# Patient Record
Sex: FEMALE | Race: WHITE | NOT HISPANIC OR LATINO | Employment: FULL TIME | URBAN - METROPOLITAN AREA
[De-identification: names, ages, dates, MRNs, and addresses within clinical notes are randomized per-mention and may not be internally consistent; named-entity substitution may affect disease eponyms.]

---

## 2024-04-04 ENCOUNTER — PATIENT ROUNDING (BHMG ONLY) (OUTPATIENT)
Dept: URGENT CARE | Facility: CLINIC | Age: 49
End: 2024-04-04
Payer: COMMERCIAL

## 2024-04-04 NOTE — ED NOTES
Thank you for letting us care for you in your recent visit to our [facility] [place]. We would love to hear about your experience with us.    We’re always looking for ways to make our patients’ experiences even better. Do you have any recommendations on ways we may improve?     I appreciate you taking the time to respond. Please be on the lookout for a survey about your recent visit from Minda Rizo via text or email. We would greatly appreciate if you could fill that out and turn it back in. We want your voice to be heard and we value your feedback.   Thank you for choosing [facility] for your healthcare needs.     Von Rivers RN/Practice Manager

## 2024-04-08 ENCOUNTER — HOSPITAL ENCOUNTER (EMERGENCY)
Facility: HOSPITAL | Age: 49
Discharge: HOME | End: 2024-04-08
Attending: STUDENT IN AN ORGANIZED HEALTH CARE EDUCATION/TRAINING PROGRAM | Admitting: STUDENT IN AN ORGANIZED HEALTH CARE EDUCATION/TRAINING PROGRAM
Payer: COMMERCIAL

## 2024-04-08 VITALS
HEART RATE: 73 BPM | RESPIRATION RATE: 16 BRPM | OXYGEN SATURATION: 92 % | TEMPERATURE: 99.1 F | WEIGHT: 257 LBS | BODY MASS INDEX: 45.54 KG/M2 | DIASTOLIC BLOOD PRESSURE: 71 MMHG | SYSTOLIC BLOOD PRESSURE: 118 MMHG | HEIGHT: 63 IN

## 2024-04-08 DIAGNOSIS — G44.009 CLUSTER HEADACHE, NOT INTRACTABLE, UNSPECIFIED CHRONICITY PATTERN: Primary | ICD-10-CM

## 2024-04-08 LAB
ALBUMIN SERPL-MCNC: 4.2 G/DL (ref 3.5–5.2)
ALBUMIN/GLOB SERPL: 1.9 G/DL
ALP SERPL-CCNC: 52 U/L (ref 39–117)
ALT SERPL W P-5'-P-CCNC: 17 U/L (ref 1–33)
ANION GAP SERPL CALCULATED.3IONS-SCNC: 8.3 MMOL/L (ref 5–15)
APTT PPP: 27.8 SECONDS (ref 22.7–35.4)
AST SERPL-CCNC: 11 U/L (ref 1–32)
BASOPHILS # BLD AUTO: 0.03 10*3/MM3 (ref 0–0.2)
BASOPHILS NFR BLD AUTO: 0.7 % (ref 0–1.5)
BILIRUB SERPL-MCNC: 0.3 MG/DL (ref 0–1.2)
BUN SERPL-MCNC: 9 MG/DL (ref 6–20)
BUN/CREAT SERPL: 9.8 (ref 7–25)
CALCIUM SPEC-SCNC: 9.4 MG/DL (ref 8.6–10.5)
CHLORIDE SERPL-SCNC: 107 MMOL/L (ref 98–107)
CO2 SERPL-SCNC: 24.7 MMOL/L (ref 22–29)
CREAT SERPL-MCNC: 0.92 MG/DL (ref 0.57–1)
DEPRECATED RDW RBC AUTO: 45.2 FL (ref 37–54)
EGFRCR SERPLBLD CKD-EPI 2021: 77 ML/MIN/1.73
EOSINOPHIL # BLD AUTO: 0.17 10*3/MM3 (ref 0–0.4)
EOSINOPHIL NFR BLD AUTO: 3.7 % (ref 0.3–6.2)
ERYTHROCYTE [DISTWIDTH] IN BLOOD BY AUTOMATED COUNT: 12.9 % (ref 12.3–15.4)
GLOBULIN UR ELPH-MCNC: 2.2 GM/DL
GLUCOSE SERPL-MCNC: 102 MG/DL (ref 65–99)
HCT VFR BLD AUTO: 38.8 % (ref 34–46.6)
HGB BLD-MCNC: 12.9 G/DL (ref 12–15.9)
IMM GRANULOCYTES # BLD AUTO: 0.02 10*3/MM3 (ref 0–0.05)
IMM GRANULOCYTES NFR BLD AUTO: 0.4 % (ref 0–0.5)
INR PPP: 1.09 (ref 0.9–1.1)
LYMPHOCYTES # BLD AUTO: 1.82 10*3/MM3 (ref 0.7–3.1)
LYMPHOCYTES NFR BLD AUTO: 39.7 % (ref 19.6–45.3)
MCH RBC QN AUTO: 31.5 PG (ref 26.6–33)
MCHC RBC AUTO-ENTMCNC: 33.2 G/DL (ref 31.5–35.7)
MCV RBC AUTO: 94.6 FL (ref 79–97)
MONOCYTES # BLD AUTO: 0.22 10*3/MM3 (ref 0.1–0.9)
MONOCYTES NFR BLD AUTO: 4.8 % (ref 5–12)
NEUTROPHILS NFR BLD AUTO: 2.32 10*3/MM3 (ref 1.7–7)
NEUTROPHILS NFR BLD AUTO: 50.7 % (ref 42.7–76)
NRBC BLD AUTO-RTO: 0 /100 WBC (ref 0–0.2)
PLATELET # BLD AUTO: 173 10*3/MM3 (ref 140–450)
PMV BLD AUTO: 9.8 FL (ref 6–12)
POTASSIUM SERPL-SCNC: 4 MMOL/L (ref 3.5–5.2)
PROT SERPL-MCNC: 6.4 G/DL (ref 6–8.5)
PROTHROMBIN TIME: 14.4 SECONDS (ref 11.7–14.2)
RBC # BLD AUTO: 4.1 10*6/MM3 (ref 3.77–5.28)
SODIUM SERPL-SCNC: 140 MMOL/L (ref 136–145)
WBC NRBC COR # BLD AUTO: 4.58 10*3/MM3 (ref 3.4–10.8)

## 2024-04-08 PROCEDURE — 85025 COMPLETE CBC W/AUTO DIFF WBC: CPT | Performed by: STUDENT IN AN ORGANIZED HEALTH CARE EDUCATION/TRAINING PROGRAM

## 2024-04-08 PROCEDURE — 96374 THER/PROPH/DIAG INJ IV PUSH: CPT

## 2024-04-08 PROCEDURE — 99283 EMERGENCY DEPT VISIT LOW MDM: CPT

## 2024-04-08 PROCEDURE — 85610 PROTHROMBIN TIME: CPT | Performed by: STUDENT IN AN ORGANIZED HEALTH CARE EDUCATION/TRAINING PROGRAM

## 2024-04-08 PROCEDURE — 25010000002 DIPHENHYDRAMINE PER 50 MG: Performed by: STUDENT IN AN ORGANIZED HEALTH CARE EDUCATION/TRAINING PROGRAM

## 2024-04-08 PROCEDURE — 85730 THROMBOPLASTIN TIME PARTIAL: CPT | Performed by: STUDENT IN AN ORGANIZED HEALTH CARE EDUCATION/TRAINING PROGRAM

## 2024-04-08 PROCEDURE — 80053 COMPREHEN METABOLIC PANEL: CPT | Performed by: STUDENT IN AN ORGANIZED HEALTH CARE EDUCATION/TRAINING PROGRAM

## 2024-04-08 PROCEDURE — 25010000002 PROCHLORPERAZINE 10 MG/2ML SOLUTION: Performed by: STUDENT IN AN ORGANIZED HEALTH CARE EDUCATION/TRAINING PROGRAM

## 2024-04-08 PROCEDURE — 96375 TX/PRO/DX INJ NEW DRUG ADDON: CPT

## 2024-04-08 RX ORDER — DIPHENHYDRAMINE HYDROCHLORIDE 50 MG/ML
25 INJECTION INTRAMUSCULAR; INTRAVENOUS ONCE
Status: COMPLETED | OUTPATIENT
Start: 2024-04-08 | End: 2024-04-08

## 2024-04-08 RX ORDER — PROCHLORPERAZINE EDISYLATE 5 MG/ML
10 INJECTION INTRAMUSCULAR; INTRAVENOUS ONCE
Status: COMPLETED | OUTPATIENT
Start: 2024-04-08 | End: 2024-04-08

## 2024-04-08 RX ADMIN — PROCHLORPERAZINE EDISYLATE 10 MG: 5 INJECTION INTRAMUSCULAR; INTRAVENOUS at 11:30

## 2024-04-08 RX ADMIN — DIPHENHYDRAMINE HYDROCHLORIDE 25 MG: 50 INJECTION, SOLUTION INTRAMUSCULAR; INTRAVENOUS at 11:30

## 2024-04-08 NOTE — ED PROVIDER NOTES
EMERGENCY DEPARTMENT ENCOUNTER    Room Number:  16/16  PCP: Romi Graves APRN  History obtained from: Patient      HPI:  Chief Complaint: Headache  A complete HPI/ROS/PMH/PSH/SH/FH are unobtainable due to: N/A  Context: Emelyn Lopez is a 49 y.o. female who presents to the ED c/o left-sided headache.  Feels like something is stabbing behind her left arm, intermittent.  Woke her from sleep several times last night.  Some dull headache on the left related to recent neck pain however this has been improving.  Has never had headache like this before.  No recent illness, fever, chills.            PAST MEDICAL HISTORY  Active Ambulatory Problems     Diagnosis Date Noted    No Active Ambulatory Problems     Resolved Ambulatory Problems     Diagnosis Date Noted    No Resolved Ambulatory Problems     Past Medical History:   Diagnosis Date    Anxiety     Depression     Seasonal allergies     Vertigo          PAST SURGICAL HISTORY  Past Surgical History:   Procedure Laterality Date    EAR TUBES      MASTOIDECTOMY Left          FAMILY HISTORY  Family History   Problem Relation Age of Onset    Supraventricular tachycardia Mother     Multiple myeloma Father          SOCIAL HISTORY  Social History     Socioeconomic History    Marital status: Unknown   Tobacco Use    Smoking status: Every Day     Current packs/day: 0.75     Types: Cigarettes     Passive exposure: Current    Smokeless tobacco: Never   Vaping Use    Vaping status: Never Used   Substance and Sexual Activity    Alcohol use: No    Drug use: Defer    Sexual activity: Defer         ALLERGIES  Alatrofloxacin, Latex, and Sulfa antibiotics        REVIEW OF SYSTEMS    As per HPI      PHYSICAL EXAM  ED Triage Vitals   Temp Heart Rate Resp BP SpO2   04/08/24 1038 04/08/24 1038 04/08/24 1038 04/08/24 1040 04/08/24 1038   99.1 °F (37.3 °C) 115 16 143/93 99 %      Temp src Heart Rate Source Patient Position BP Location FiO2 (%)   04/08/24 1038 04/08/24 1038 04/08/24  1040 04/08/24 1040 --   Tympanic Monitor Sitting Right arm        Physical Exam  Constitutional:       General: She is not in acute distress.  HENT:      Head: Normocephalic and atraumatic.   Cardiovascular:      Rate and Rhythm: Normal rate and regular rhythm.   Pulmonary:      Effort: Pulmonary effort is normal. No respiratory distress.   Abdominal:      General: There is no distension.      Palpations: Abdomen is soft.      Tenderness: There is no abdominal tenderness.   Musculoskeletal:         General: No swelling or deformity.   Skin:     General: Skin is warm and dry.   Neurological:      Mental Status: She is alert. Mental status is at baseline.           Vital signs and nursing notes reviewed.          LAB RESULTS  Recent Results (from the past 24 hour(s))   Comprehensive Metabolic Panel    Collection Time: 04/08/24 11:25 AM    Specimen: Blood   Result Value Ref Range    Glucose 102 (H) 65 - 99 mg/dL    BUN 9 6 - 20 mg/dL    Creatinine 0.92 0.57 - 1.00 mg/dL    Sodium 140 136 - 145 mmol/L    Potassium 4.0 3.5 - 5.2 mmol/L    Chloride 107 98 - 107 mmol/L    CO2 24.7 22.0 - 29.0 mmol/L    Calcium 9.4 8.6 - 10.5 mg/dL    Total Protein 6.4 6.0 - 8.5 g/dL    Albumin 4.2 3.5 - 5.2 g/dL    ALT (SGPT) 17 1 - 33 U/L    AST (SGOT) 11 1 - 32 U/L    Alkaline Phosphatase 52 39 - 117 U/L    Total Bilirubin 0.3 0.0 - 1.2 mg/dL    Globulin 2.2 gm/dL    A/G Ratio 1.9 g/dL    BUN/Creatinine Ratio 9.8 7.0 - 25.0    Anion Gap 8.3 5.0 - 15.0 mmol/L    eGFR 77.0 >60.0 mL/min/1.73   Protime-INR    Collection Time: 04/08/24 11:25 AM    Specimen: Blood   Result Value Ref Range    Protime 14.4 (H) 11.7 - 14.2 Seconds    INR 1.09 0.90 - 1.10   aPTT    Collection Time: 04/08/24 11:25 AM    Specimen: Blood   Result Value Ref Range    PTT 27.8 22.7 - 35.4 seconds   CBC Auto Differential    Collection Time: 04/08/24 11:25 AM    Specimen: Blood   Result Value Ref Range    WBC 4.58 3.40 - 10.80 10*3/mm3    RBC 4.10 3.77 - 5.28 10*6/mm3     Hemoglobin 12.9 12.0 - 15.9 g/dL    Hematocrit 38.8 34.0 - 46.6 %    MCV 94.6 79.0 - 97.0 fL    MCH 31.5 26.6 - 33.0 pg    MCHC 33.2 31.5 - 35.7 g/dL    RDW 12.9 12.3 - 15.4 %    RDW-SD 45.2 37.0 - 54.0 fl    MPV 9.8 6.0 - 12.0 fL    Platelets 173 140 - 450 10*3/mm3    Neutrophil % 50.7 42.7 - 76.0 %    Lymphocyte % 39.7 19.6 - 45.3 %    Monocyte % 4.8 (L) 5.0 - 12.0 %    Eosinophil % 3.7 0.3 - 6.2 %    Basophil % 0.7 0.0 - 1.5 %    Immature Grans % 0.4 0.0 - 0.5 %    Neutrophils, Absolute 2.32 1.70 - 7.00 10*3/mm3    Lymphocytes, Absolute 1.82 0.70 - 3.10 10*3/mm3    Monocytes, Absolute 0.22 0.10 - 0.90 10*3/mm3    Eosinophils, Absolute 0.17 0.00 - 0.40 10*3/mm3    Basophils, Absolute 0.03 0.00 - 0.20 10*3/mm3    Immature Grans, Absolute 0.02 0.00 - 0.05 10*3/mm3    nRBC 0.0 0.0 - 0.2 /100 WBC       Ordered the above labs and reviewed the results.        RADIOLOGY  No Radiology Exams Resulted Within Past 24 Hours    Ordered the above noted radiological studies. Reviewed by me in PACS.              MEDICATIONS GIVEN IN ER  Medications   prochlorperazine (COMPAZINE) injection 10 mg (10 mg Intravenous Given 4/8/24 1130)   diphenhydrAMINE (BENADRYL) injection 25 mg (25 mg Intravenous Given 4/8/24 1130)               MEDICAL DECISION MAKING, PROGRESS, and CONSULTS    MDM: Patient presented emergency department with stabbing intermittent left-sided headache, retro-orbital.  Otherwise well-appearing, vitals otherwise stable.  Improved after high flow oxygen in the ER.  Labs otherwise reassuring.  Completely resolved at reevaluation.  Will discharge with supportive care and neurology follow-up.  Given return precautions and discharged home.    All labs have been independently reviewed by me.  All radiology studies have been reviewed by me and I have also reviewed the radiology report.   EKG's independently viewed and interpreted by me.  Discussion below represents my analysis of pertinent findings related to patient's  condition, differential diagnosis, treatment plan and final disposition.      Additional sources:  - Discussed/ obtained information from independent historians:      - External (non-ED) record review:     - Chronic or social conditions impacting care:     - Shared decision making: Discussed plan for discharge with outpatient follow-up, patient agrees      Orders placed during this visit:  Orders Placed This Encounter   Procedures    Comprehensive Metabolic Panel    Protime-INR    aPTT    CBC Auto Differential    Ambulatory Referral to Neurology    Oxygen Therapy- Non-Rebreather Mask; Titrate 10-15 LPM Per SpO2; 90 - 95%    CBC & Differential         Additional orders considered but not ordered:  Considered head imaging however given the patient is completely asymptomatic after medications here will defer at this time.        Differential diagnosis includes but is not limited to:    Cluster headache, migraine, tension headache, acute angle-closure glaucoma      Independent interpretation of labs, radiology studies, and discussions with consultants:           DIAGNOSIS  Final diagnoses:   Cluster headache, not intractable, unspecified chronicity pattern         DISPOSITION  Discharged home        Latest Documented Vital Signs:  As of 13:28 EDT  BP- 118/71 HR- 73 Temp- 99.1 °F (37.3 °C) (Tympanic) O2 sat- 92%              --    Please note that portions of this were completed with a voice recognition program.       Note Disclaimer: At [facility], we believe that sharing information builds trust and better relationships. You are receiving this note because you are receiving care at [facility] or recently visited. It is possible you will see health information before a provider has talked with you about it. This kind of information can be easy to misunderstand. To help you fully understand what it means for your health, we urge you to discuss this note with your provider.             Tereso Nathan MD  04/08/24  [id number]

## 2024-04-08 NOTE — Clinical Note
[facility] EMERGENCY DEPARTMENT  [address] [facility] [address]  Phone: [phone]    Emelyn Lopez was seen and treated in our emergency department on 4/8/2024.  She may return to work on 04/09/2024.         Thank you for choosing [facility].    Thea Dennis RN

## 2024-05-21 ENCOUNTER — OFFICE VISIT (OUTPATIENT)
Dept: NEUROLOGY | Facility: CLINIC | Age: 49
End: 2024-05-21
Payer: COMMERCIAL

## 2024-05-21 VITALS
SYSTOLIC BLOOD PRESSURE: 124 MMHG | DIASTOLIC BLOOD PRESSURE: 70 MMHG | OXYGEN SATURATION: 97 % | WEIGHT: 254 LBS | BODY MASS INDEX: 45 KG/M2 | HEIGHT: 63 IN | HEART RATE: 84 BPM

## 2024-05-21 DIAGNOSIS — G43.719 INTRACTABLE CHRONIC MIGRAINE WITHOUT AURA AND WITHOUT STATUS MIGRAINOSUS: Primary | ICD-10-CM

## 2024-05-21 DIAGNOSIS — M54.81 OCCIPITAL NEURALGIA OF LEFT SIDE: ICD-10-CM

## 2024-05-21 DIAGNOSIS — G44.86 CERVICOGENIC HEADACHE: ICD-10-CM

## 2024-05-21 PROCEDURE — 99214 OFFICE O/P EST MOD 30 MIN: CPT | Performed by: NURSE PRACTITIONER

## 2024-05-21 PROCEDURE — 1159F MED LIST DOCD IN RCRD: CPT | Performed by: NURSE PRACTITIONER

## 2024-05-21 PROCEDURE — 1160F RVW MEDS BY RX/DR IN RCRD: CPT | Performed by: NURSE PRACTITIONER

## 2024-05-21 RX ORDER — SUMATRIPTAN 100 MG/1
100 TABLET, FILM COATED ORAL ONCE AS NEEDED
Qty: 9 TABLET | Refills: 2 | Status: SHIPPED | OUTPATIENT
Start: 2024-05-21 | End: 2025-05-21

## 2024-05-21 RX ORDER — TOPIRAMATE 25 MG/1
50 TABLET ORAL 2 TIMES DAILY
Qty: 120 TABLET | Refills: 2 | Status: SHIPPED | OUTPATIENT
Start: 2024-05-21

## 2024-05-21 RX ORDER — AMITRIPTYLINE HYDROCHLORIDE 10 MG/1
10 TABLET, FILM COATED ORAL NIGHTLY
COMMUNITY
Start: 2024-04-22

## 2024-05-21 NOTE — PROGRESS NOTES
DOS: 2024  NAME: Emelyn Lopez   : 1975  PCP: Romi Graves APRN  Chief Complaint   Patient presents with    Migraine      Referring MD: Tereso Nathan MD    Neurological Problem and Interval History:  49 y.o. female with a history of anxiety and depression, seasonal allergies, neck pain, dizziness and headache. She presents today for evaluation for headache. She states she has had headaches for several years but over the past year they have worsened. She reports an almost daily headache. The pain is mostly left sided but she does have headaches that occur over the entire headache. She has photophobia, phonophobia and nausea with the ones that occur over the entire head. She does note chronic neck pain and says she had been given a referral to PT but unfortunately was not able to get this set up and needs a new referral. She has tenderness to the left occipital area. She sees a psychiatrist for her anxiety and depression. She takes Celexa 30 mg daily. She was given a prescription for amitriptyline as well but could not tolerate the side effects and has stopped taking it. She has never been on a triptan. She took a friend's Ubrelvy and says this worked well for her. States she drinks plenty of water. Gets only 5-6 hours of sleep per night. Exercises at least 3 days per week. Cares for her grandfather who has dementia and lives with her. She has quite a bit of stress related to this and attributes this to the little sleep she gets per night.     Past Medical/Surgical Hx:  Past Medical History:   Diagnosis Date    Anxiety     Depression     Seasonal allergies     Vertigo      Past Surgical History:   Procedure Laterality Date    EAR TUBES      MASTOIDECTOMY Left        Review of Systems:        Review of Systems   HENT:  Positive for tinnitus.    Eyes:  Positive for photophobia and visual disturbance.   Gastrointestinal:  Positive for nausea. Negative for vomiting.   Musculoskeletal:  Positive  for neck pain and neck stiffness.   Neurological:  Positive for dizziness, light-headedness and headache. Negative for tremors, seizures, syncope, facial asymmetry, speech difficulty, weakness, numbness, memory problem and confusion.      Medications    Current Outpatient Medications:     albuterol sulfate  (90 Base) MCG/ACT inhaler, Inhale 2 puffs Every 4 (Four) Hours As Needed for Wheezing., Disp: 6.7 g, Rfl: 0    cefdinir (OMNICEF) 300 MG capsule, Take 1 capsule by mouth 2 (Two) Times a Day., Disp: 20 capsule, Rfl: 0    cetirizine (zyrTEC) 10 MG tablet, , Disp: , Rfl:     citalopram (CeleXA) 10 MG tablet, Take 3 tablets by mouth Daily., Disp: , Rfl:     citalopram (CeleXA) 20 MG tablet, 1.5 tablets., Disp: , Rfl:     fluticasone (FLONASE) 50 MCG/ACT nasal spray, , Disp: , Rfl:     meclizine (ANTIVERT) 25 MG tablet, Take 1 tablet by mouth 3 (Three) Times a Day As Needed for Dizziness., Disp: , Rfl:     TiZANidine (ZANAFLEX) 4 MG capsule, Take 1 capsule by mouth 3 (Three) Times a Day As Needed for Muscle Spasms., Disp: 30 capsule, Rfl: 0     Allergies:  Allergies   Allergen Reactions    Alatrofloxacin Nausea Only and Dizziness    Latex Swelling and Rash     LATEX OR ADHESIVE REACTION    Sulfa Antibiotics Nausea Only and Headache       Social Hx:  Social History     Socioeconomic History    Marital status: Unknown   Tobacco Use    Smoking status: Every Day     Current packs/day: 0.75     Types: Cigarettes     Passive exposure: Current    Smokeless tobacco: Never   Vaping Use    Vaping status: Never Used   Substance and Sexual Activity    Alcohol use: No    Drug use: Defer    Sexual activity: Defer       Family Hx:  Family History   Problem Relation Age of Onset    Supraventricular tachycardia Mother     Multiple myeloma Father        I have reviewed and confirmed the accuracy of the patient's history: Chief complaint, HPI, ROS, Subjective, and Past Family Social History as entered by the MA/LPN/RN. Elizabeth  ERINN Hanna 05/21/24      Review of Imaging (Interpretation of scans not reports):      Laboratory Results:       Physical Examination:  General Appearance:  Well developed, well nourished, well groomed, alert, and cooperative.  HEENT: Normocephalic.    Neck and Spine: Normal range of motion.  Normal alignment. No mass or tenderness. No bruits.  Cardiac: Regular rate and rhythm. No murmurs.  Peripheral Vasculature: Radial and pedal pulses are equal and symmetric.   Extremities: No edema or deformities. Normal joint ROM.  Skin: No rashes or birth marks.      Neurological examination:   Higher Integrative Function: Oriented to time, place, and person. Normal registration, recall attention span and concentration. Normal language including comprehension, spontaneous speech, repetition, reading, writing, naming and vocabulary. No neglect with normal visual-spatial function and construction. Normal fund of knowledge and higher integrative function.   CN II: Pupils are equal, round and reactive to light. Normal visual acuity and visual fields.   CN III IV VI: Extraocular movements are full without nystagmus.   CN V: Normal facial sensation and strength of muscles of mastication.   CN VII: Facial movements are symmetric. No weakness.   CN VIII: Auditory acuity is normal.  CN IX & X: Symmetric palatal movement.   CN XI: Sternocleidomastoid and trapezius are normal. No weakness.   CN XII: The tongue is midline. No atrophy or fasciculations.  Motor: Normal muscle strength, bulk and tone in upper and lower extremities. No fasciculations, rigidity, spasticity, or abnormal movements.   Reflexes: 2+ in the upper and lower extremities. Plantar responses are flexor.   Sensation: Normal light touch, pinprick, vibration, temperature, and proprioception in the arms and legs.   Station and Gait: Normal gait and station.   Coordination: Finger to nose test shows no dysmetria. Rapid alternating movements are normal. Heel to shin is normal.             Diagnoses / Discussion:      Plan:       Diagnoses and all orders for this visit:    1. Intractable chronic migraine without aura and without status migrainosus (Primary)  -     Ambulatory Referral to Physical Therapy    2. Cervicogenic headache  -     Ambulatory Referral to Physical Therapy    3. Occipital neuralgia of left side  -     Ambulatory Referral to Physical Therapy    Other orders  -     SUMAtriptan (IMITREX) 100 MG tablet; Take 1 tablet by mouth 1 (One) Time As Needed for Migraine. Take one tablet at onset of headache. May repeat dose one time in 2 hours if headache not relieved.  Dispense: 9 tablet; Refill: 2  -     topiramate (TOPAMAX) 25 MG tablet; Take 2 tablets by mouth 2 (Two) Times a Day.  Dispense: 120 tablet; Refill: 2    Her headaches are most consistent with a mix of occipital neuralgia and migraine. I placed a new referral to PT. She is going to stop amitriptyline and will try topiramate 25 mg twice a day. After a few weeks she can increase to 50 mg twice a day if needed. Side effects discussed. Will try sumatriptan 100 mg as needed. Risks, benefits, side effects reviewed. She did like the Ubrelvy but has not tried any triptans so we likely need to have her try at least 1-2 before insurance will cover Ubrelvy. Discussed continue to work on control of her anxiety and depression to reduce stress and improve headaches. Recommended 8 hours of sleep per night and continuing to drink plenty of water. Discussed the importance of continuing regular exercise as well.     She will call with any problems.   Follow up 8-10 weeks.     Coding

## 2024-05-21 NOTE — LETTER
May 21, 2024       No Recipients    Patient: Emelyn Lopez   YOB: 1975   Date of Visit: 2024     Dear Tereso Nathan MD:       Thank you for referring Emelyn Lopez to me for evaluation. Below are the relevant portions of my assessment and plan of care.    If you have questions, please do not hesitate to call me. I look forward to following Emelyn along with you.         Sincerely,        STEFANY Ricketts        CC:   No Recipients    Lui Block APRN  [phone]  Sign when Signing Visit  DOS: 2024  NAME: Emelyn Lopez   : 1975  PCP: Romi Graves APRN  Chief Complaint   Patient presents with   • Migraine      Referring MD: Tereso Nathan MD    Neurological Problem and Interval History:  49 y.o. female with a history of anxiety and depression, seasonal allergies, neck pain, dizziness and headache. She presents today for evaluation for headache. She states she has had headaches for several years but over the past year they have worsened. She reports an almost daily headache. The pain is mostly left sided but she does have headaches that occur over the entire headache. She has photophobia, phonophobia and nausea with the ones that occur over the entire head. She does note chronic neck pain and says she had been given a referral to PT but unfortunately was not able to get this set up and needs a new referral. She has tenderness to the left occipital area. She sees a psychiatrist for her anxiety and depression. She takes Celexa 30 mg daily. She was given a prescription for amitriptyline as well but could not tolerate the side effects and has stopped taking it. She has never been on a triptan. She took a friend's Ubrelvy and says this worked well for her. States she drinks plenty of water. Gets only 5-6 hours of sleep per night. Exercises at least 3 days per week. Cares for her grandfather who has dementia and lives with her. She has quite a bit of stress  related to this and attributes this to the little sleep she gets per night.     Past Medical/Surgical Hx:  Past Medical History:   Diagnosis Date   • Anxiety    • Depression    • Seasonal allergies    • Vertigo      Past Surgical History:   Procedure Laterality Date   • EAR TUBES     • MASTOIDECTOMY Left        Review of Systems:        Review of Systems   HENT:  Positive for tinnitus.    Eyes:  Positive for photophobia and visual disturbance.   Gastrointestinal:  Positive for nausea. Negative for vomiting.   Musculoskeletal:  Positive for neck pain and neck stiffness.   Neurological:  Positive for dizziness, light-headedness and headache. Negative for tremors, seizures, syncope, facial asymmetry, speech difficulty, weakness, numbness, memory problem and confusion.      Medications    Current Outpatient Medications:   •  albuterol sulfate  (90 Base) MCG/ACT inhaler, Inhale 2 puffs Every 4 (Four) Hours As Needed for Wheezing., Disp: 6.7 g, Rfl: 0  •  cefdinir (OMNICEF) 300 MG capsule, Take 1 capsule by mouth 2 (Two) Times a Day., Disp: 20 capsule, Rfl: 0  •  cetirizine (zyrTEC) 10 MG tablet, , Disp: , Rfl:   •  citalopram (CeleXA) 10 MG tablet, Take 3 tablets by mouth Daily., Disp: , Rfl:   •  citalopram (CeleXA) 20 MG tablet, 1.5 tablets., Disp: , Rfl:   •  fluticasone (FLONASE) 50 MCG/ACT nasal spray, , Disp: , Rfl:   •  meclizine (ANTIVERT) 25 MG tablet, Take 1 tablet by mouth 3 (Three) Times a Day As Needed for Dizziness., Disp: , Rfl:   •  TiZANidine (ZANAFLEX) 4 MG capsule, Take 1 capsule by mouth 3 (Three) Times a Day As Needed for Muscle Spasms., Disp: 30 capsule, Rfl: 0     Allergies:  Allergies   Allergen Reactions   • Alatrofloxacin Nausea Only and Dizziness   • Latex Swelling and Rash     LATEX OR ADHESIVE REACTION   • Sulfa Antibiotics Nausea Only and Headache       Social Hx:  Social History     Socioeconomic History   • Marital status: Unknown   Tobacco Use   • Smoking status: Every Day      Current packs/day: 0.75     Types: Cigarettes     Passive exposure: Current   • Smokeless tobacco: Never   Vaping Use   • Vaping status: Never Used   Substance and Sexual Activity   • Alcohol use: No   • Drug use: Defer   • Sexual activity: Defer       Family Hx:  Family History   Problem Relation Age of Onset   • Supraventricular tachycardia Mother    • Multiple myeloma Father        I have reviewed and confirmed the accuracy of the patient's history: Chief complaint, HPI, ROS, Subjective, and Past Family Social History as entered by the MA/LPN/RN. Elizabeth Hanna MA 05/21/24      Review of Imaging (Interpretation of scans not reports):      Laboratory Results:       Physical Examination:  General Appearance:  Well developed, well nourished, well groomed, alert, and cooperative.  HEENT: Normocephalic.    Neck and Spine: Normal range of motion.  Normal alignment. No mass or tenderness. No bruits.  Cardiac: Regular rate and rhythm. No murmurs.  Peripheral Vasculature: Radial and pedal pulses are equal and symmetric.   Extremities: No edema or deformities. Normal joint ROM.  Skin: No rashes or birth marks.      Neurological examination:   Higher Integrative Function: Oriented to time, place, and person. Normal registration, recall attention span and concentration. Normal language including comprehension, spontaneous speech, repetition, reading, writing, naming and vocabulary. No neglect with normal visual-spatial function and construction. Normal fund of knowledge and higher integrative function.   CN II: Pupils are equal, round and reactive to light. Normal visual acuity and visual fields.   CN III IV VI: Extraocular movements are full without nystagmus.   CN V: Normal facial sensation and strength of muscles of mastication.   CN VII: Facial movements are symmetric. No weakness.   CN VIII: Auditory acuity is normal.  CN IX & X: Symmetric palatal movement.   CN XI: Sternocleidomastoid and trapezius are normal. No  weakness.   CN XII: The tongue is midline. No atrophy or fasciculations.  Motor: Normal muscle strength, bulk and tone in upper and lower extremities. No fasciculations, rigidity, spasticity, or abnormal movements.   Reflexes: 2+ in the upper and lower extremities. Plantar responses are flexor.   Sensation: Normal light touch, pinprick, vibration, temperature, and proprioception in the arms and legs.   Station and Gait: Normal gait and station.   Coordination: Finger to nose test shows no dysmetria. Rapid alternating movements are normal. Heel to shin is normal.            Diagnoses / Discussion:      Plan:       Diagnoses and all orders for this visit:    1. Intractable chronic migraine without aura and without status migrainosus (Primary)  -     Ambulatory Referral to Physical Therapy    2. Cervicogenic headache  -     Ambulatory Referral to Physical Therapy    3. Occipital neuralgia of left side  -     Ambulatory Referral to Physical Therapy    Other orders  -     SUMAtriptan (IMITREX) 100 MG tablet; Take 1 tablet by mouth 1 (One) Time As Needed for Migraine. Take one tablet at onset of headache. May repeat dose one time in 2 hours if headache not relieved.  Dispense: 9 tablet; Refill: 2  -     topiramate (TOPAMAX) 25 MG tablet; Take 2 tablets by mouth 2 (Two) Times a Day.  Dispense: 120 tablet; Refill: 2    Her headaches are most consistent with a mix of occipital neuralgia and migraine. I placed a new referral to PT. She is going to stop amitriptyline and will try topiramate 25 mg twice a day. After a few weeks she can increase to 50 mg twice a day if needed. Side effects discussed. Will try sumatriptan 100 mg as needed. Risks, benefits, side effects reviewed. She did like the Ubrelvy but has not tried any triptans so we likely need to have her try at least 1-2 before insurance will cover Ubrelvy. Discussed continue to work on control of her anxiety and depression to reduce stress and improve headaches.  Recommended 8 hours of sleep per night and continuing to drink plenty of water. Discussed the importance of continuing regular exercise as well.     She will call with any problems.   Follow up 8-10 weeks.     Coding

## 2024-05-22 ENCOUNTER — PATIENT ROUNDING (BHMG ONLY) (OUTPATIENT)
Dept: NEUROLOGY | Facility: CLINIC | Age: 49
End: 2024-05-22
Payer: COMMERCIAL

## 2024-05-23 ENCOUNTER — TELEPHONE (OUTPATIENT)
Dept: NEUROLOGY | Facility: CLINIC | Age: 49
End: 2024-05-23
Payer: COMMERCIAL

## 2024-05-23 NOTE — TELEPHONE ENCOUNTER
Provider: ANNALISE    Caller: DORIE     Relationship to Patient: SELF     Phone Number: [phone]    Reason for Call: PATIENT TOPAMAX WAS DENIED BY INSURANCE; PATIENT IS REQUESTING FOR OTHER OPTIONS. PLEASE CONTACT PATIENT ONCE A SOLUTION IS FOUND.    PLEASE REVIEW    THANK YOU

## 2024-06-11 ENCOUNTER — HOSPITAL ENCOUNTER (OUTPATIENT)
Dept: PHYSICAL THERAPY | Facility: HOSPITAL | Age: 49
Discharge: HOME | End: 2024-06-11
Admitting: NURSE PRACTITIONER
Payer: COMMERCIAL

## 2024-06-11 DIAGNOSIS — M54.81 OCCIPITAL NEURALGIA OF LEFT SIDE: ICD-10-CM

## 2024-06-11 DIAGNOSIS — G44.86 CERVICOGENIC HEADACHE: Primary | ICD-10-CM

## 2024-06-11 PROCEDURE — 97161 PT EVAL LOW COMPLEX 20 MIN: CPT

## 2024-06-11 PROCEDURE — 97110 THERAPEUTIC EXERCISES: CPT

## 2024-06-11 NOTE — THERAPY EVALUATION
Outpatient Physical Therapy Ortho Initial Evaluation  [facility]     Patient Name: Emelyn Lopez  : 1975  MRN: [id number]  Today's Date: 2024      Visit Date: 2024    There is no problem list on file for this patient.       Past Medical History:   Diagnosis Date    Anxiety     Cluster headache     Depression     Migraine     Seasonal allergies     Vertigo         Past Surgical History:   Procedure Laterality Date    EAR TUBES      MASTOIDECTOMY Left        Visit Dx:     ICD-10-CM ICD-9-CM   1. Cervicogenic headache  G44.86 784.0   2. Occipital neuralgia of left side  M54.81 723.8          Patient History       Row Name 24 0700             History    Chief Complaint Pain  Headaches  -ER      Type of Pain Neck pain  -ER      Date Current Problem(s) Began --  2023, cleaning garage  -ER      Brief Description of Current Complaint Emelyn Lopez is a 49 year old female referred to [facility] for evaluation of cervicogenic headaches with L sided occipital neuralgia. She reports that she started to notice L sided neck pain when she was cleaning out her garage and said when lifting with her L hand, the right side of her neck started hurting and vise versa. She also has hx of TMJ spasms per report. She has used muscle relaxers for pain management, migraine meds as well. She notes that her headaches have been progressing over the past year, and she has dizziness 24/7. She has difficulty with OH repetitive movements. 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  -ER      Previous treatment for THIS PROBLEM Surgery  Muscle relaxersm, migraine meds, tylenol and advil as needed  -ER      Patient/Caregiver Goals Relieve pain;Return to work;Improve mobility;Improve strength;Know what to do to help the symptoms;Relief from dizziness  -ER      Occupation/sports/leisure activities Lifting, desk work  -ER       Patient seeing anyone else for problem(s)? Neurologist  -ER      How has patient tried to help current problem? Meds as needed, laying down in a cold dark room  -ER      History of Previous Related Injuries L ear: mastoidectomy w/ graft, bilateral ears balloon  -ER         Pain     Pain Location Neck  -ER      Pain at Present 3  -ER      Pain at Best 3  -ER      Pain at Worst 8  -ER      Pain Frequency Intermittent  -ER      Pain Description Tightness;Nagging  -ER      What Performance Factors Make the Current Problem(s) WORSE? Typing at a desk, looking down trying to read, lifting weights, gardening  -ER      What Performance Factors Make the Current Problem(s) BETTER? Resting in a cold dark room, medication as needed, heating pad, massage gun  -ER      Is your sleep disturbed? Yes  -ER      Total hours of sleep per night --  if on muscle relaxer - not waking, otherwise is waking up  -ER      What position do you sleep in? Right sidelying;Left sidelying;Prone  -ER      Difficulties at work? typing on computer  -ER      Difficulties with ADL's? N/A  -ER      Difficulties with recreational activities? pain limiting, dizziness  -ER         Fall Risk Assessment    Any falls in the past year: No  reports near falls several times  -ER         Services    Prior Rehab/Home Health Experiences No  -ER      Are you currently receiving Home Health services No  -ER      Do you plan to receive Home Health services in the near future No  -ER         Daily Activities    Primary Language English  -ER      Are you able to read Yes  -ER      Are you able to write Yes  -ER      How does patient learn best? Listening;Reading;Demonstration  -ER      Does patient have problems with the following? Depression;Anxiety;Panic Attack  -ER      Barriers to learning None  -ER      Pt Participated in POC and Goals Yes  -ER                User Key  (r) = Recorded By, (t) = Taken By, (c) = Cosigned By      Initials Name Provider Type    ER Danial,  PEMA Portillo Physical Therapist                     PT Ortho       Row Name 06/11/24 0700       Posture/Observations    Alignment Options Forward head;Rounded shoulders  -ER    Forward Head Moderate  -ER    Rounded Shoulders Moderate;Severe  -ER    Posture/Observations Comments extremely guarded UT bilaterally with trigger points  -ER       Quarter Clearing    Quarter Clearing Upper Quarter Clearing  -ER       DTR- Upper Quarter Clearing    Biceps (C5/6) Left:;1- Minimal response;Right:;2- Normal response  -ER       Sensory Screen for Light Touch- Upper Quarter Clearing    C4 (posterior shoulder) Bilateral:;Intact  -ER    C5 (lateral upper arm) Bilateral:;Intact  -ER    C6 (tip of thumb) Bilateral:;Diminished  -ER    C7 (tip of 3rd finger) Bilateral:;Diminished  -ER    C8 (tip of 5th finger) Bilateral:;Diminished  -ER    T1 (medial lower arm) Bilateral:;Diminished  -ER       Myotomal Screen- Upper Quarter Clearing    Shoulder flexion (C5) Right:;4+ (Good +);Left:;4 (Good)  -ER    Elbow flexion/wrist extension (C6) Bilateral:;4 (Good)  -ER    Elbow extension/wrist flexion (C7) Bilateral:;4 (Good)  -ER    Finger flexion/ (C8) Bilateral:;4 (Good)  -ER     Bilateral:;4 (Good)  -ER       Cervical/Shoulder ROM Screen    Cervical flexion Impaired  Limited by 25%, pain relieving  -ER    Cervical extension Impaired  limited by 25%, pain relieving  -ER    Cervical lateral flexion Impaired  Limited by 50% to L, limited by 25% to R  -ER    Cervical rotation Impaired  to the R normal, to the L limited by 25%  -ER    Shoulder elevation  Normal  -ER       Special Tests/Palpation    Special Tests/Palpation Cervical/Thoracic  -ER       Cervical Palpation    Cervical Palpation- Location? Occiput;SCM;Upper traps;Masseter  -ER    Occiput Bilateral:;Tender;Guarded/taut  -ER    SCM Bilateral:;Guarded/taut;Warm  -ER    Upper Traps Bilateral:;Tender;Guarded/taut;Elicits spasm  -ER    Masseter Bilateral:;Guarded/taut;Elicits spasm  -ER        Cervical Accessory Motions    Cervical Accessory Motions Tested? Yes  -ER    Sideglide- C3 Hypomobile;Left pain  -ER    Sideglide- C4 Hypomobile;Left pain  -ER    Sideglide- C5 Hypomobile;Left pain  -ER    Sideglide- C6 Hypomobile;Left pain  -ER    Sideglide- C7 Hypomobile;Left pain  -ER    PA glide- C1 Hypomobile  -ER    PA glide- C2 Hypomobile  -ER    PA glide- C3 Hypomobile  -ER    PA glide- C4 Hypomobile  -ER    PA glide- C5 Hypomobile  -ER    PA glide- C6 Hypomobile  -ER    PA glide- C7 Hypomobile  -ER       Thoracic Accessory Motions    Thoracic Accessory Motions Tested? Yes  -ER    PA glide- T1 Hypomobile  -ER    PA glide- T2 Hypomobile  -ER    PA glide- T3 Hypomobile  -ER    PA glide- T4 Hypomobile  -ER    PA glide- T5 Hypomobile  -ER    PA glide- T6 Hypomobile  -ER    PA glide- T7 Hypomobile  -ER    PA glide- T8 Hypomobile  -ER       General ROM    Head/Neck/Trunk Neck Lt Lateral Flexion;Neck Rt Lateral Flexion;Neck Lt Rotation;Neck Rt Rotation  -ER       Head/Neck/Trunk    Neck Lt Lateral Flexion AROM 12  -ER    Neck Rt Lateral Flexion AROM 20  -ER    Neck Lt Rotation AROM 40  -ER    Neck Rt Rotation AROM 45  -ER    Head/Neck/Trunk Comments seated  -ER       MMT (Manual Muscle Testing)    Rt Upper Ext Rt Shoulder Flexion;Rt Shoulder ABduction  -ER    Lt Upper Ext Lt Shoulder Flexion;Lt Shoulder ABduction  -ER       MMT Right Upper Ext    Rt Shoulder Flexion MMT, Gross Movement (4+/5) good plus  -ER    Rt Shoulder ABduction MMT, Gross Movement (4+/5) good plus  -ER       MMT Left Upper Ext    Lt Shoulder Flexion MMT, Gross Movement (4/5) good  -ER    Lt Shoulder ABduction MMT, Gross Movement (4/5) good  -ER       Sensation    Sensation WNL? WNL  -ER    Additional Comments some N/T into bilateral fingers  -ER       Flexibility    Flexibility Tested? Upper Extremity  -ER       Upper Extremity Flexibility    Suboccipitals Left:;Mildly limited  -ER    SCM Bilateral:;Mildly limited  -ER    Upper Trapezius  Bilateral:;Moderately limited  -ER    Pect Major Bilateral:;Moderately limited  -ER              User Key  (r) = Recorded By, (t) = Taken By, (c) = Cosigned By      Initials Name Provider Type    Lindsey Mulligan, PT Physical Therapist                                Therapy Education  Education Details: Educated on PT role and POC; discussed expectations/timeframe for healing. Provided HEP, Access Code: [email or web address]. Discussed ergonomics in workplace.  Given: HEP, Symptoms/condition management  Program: New  How Provided: Verbal, Demonstration, Written  Provided to: Patient  Level of Understanding: Teach back education performed, Verbalized, Demonstrated      PT OP Goals       Row Name 06/11/24 1200          PT Short Term Goals    STG Date to Achieve 07/11/24  -ER     STG 1 Pt will be independent and verbalized good understanding of initial HEP to improve ability to manage pain, as well as improve strength and ROM.  -ER     STG 1 Progress New  -ER     STG 2 Pt to be educated in/verbalize understanding of the importance of posture/ergonomics in association with their condition to facilitate self management of their condition.  -ER     STG 2 Progress New  -ER     STG 3 Pt. will improve bilateral cervical rotation to 45 deg to assist with driving safely  -ER     STG 3 Progress New  -ER     STG 4 --  -ER     STG 4 Progress --  -ER        Long Term Goals    LTG Date to Achieve 08/10/24  -ER     LTG 1 Pt will be independent and verbalized good understanding of advanced HEP to improve ability to manage pain, as well as improve strength and ROM.  -ER     LTG 1 Progress New  -ER     LTG 2 Patient will improve ability to sleep through the night with 0 sleep disturbances related to shoulder pain to improve overall sleep quality and quality of life.  -ER     LTG 2 Progress New  -ER     LTG 3 Pt will improve NDI score to at least 45% perceived disability to show overall improved quality of life.  -ER     LTG 3 Progress New  -ER      LTG 4 Pt will improve bilateral shoulder flexion strength to at least 4+/5.  -ER     LTG 4 Progress New  -ER     LTG 5 --  -ER     LTG 5 Progress --  -ER        Time Calculation    PT Goal Re-Cert Due Date 09/09/24  -ER               User Key  (r) = Recorded By, (t) = Taken By, (c) = Cosigned By      Initials Name Provider Type    ER Lindsey Barrow, PT Physical Therapist                     PT Assessment/Plan       Row Name 06/11/24 1200          PT Assessment    Functional Limitations Limitation in home management;Limitations in community activities;Performance in leisure activities;Performance in self-care ADL;Performance in work activities  -ER     Impairments Sensation;Range of motion;Posture;Pain;Muscle strength;Joint mobility;Joint integrity  -ER     Assessment Comments Emelyn Lopez is a 49 y.o. female referred to physical therapy for L sided occipital neuralgia, cervicogenic headache. 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 Pt presents today with signficant guarding in bilateral UT, pec major, TTP along mastoid, SCM, UT, and generalized hypomobility in T-spine. L>R pain. Reduced bilateral Sb'ing, rotation which also affects her driving - along with present dizziness. Her signs and symptoms are consistent with referring diagnosis. The previous impairments limit her ability to drive, change positions, perform OH repetitive motions and perform work tasks related to sitting at a desk and typing. Patients NDI outcome measure, 30/50 = 60%, where 100% is severe disability. Pt will benefit from skilled PT to address the previous impairments and return to PLOF.  -ER     Please refer to paper survey for additional self-reported information No  -ER     Rehab Potential Good  -ER      Patient/caregiver participated in establishment of treatment plan and goals Yes  -ER     Patient would benefit from skilled therapy intervention Yes  -ER        PT Plan    PT Frequency 2x/week  -ER     Predicted Duration of Therapy Intervention (PT) 8-10 visits  -ER     Planned CPT's? PT EVAL LOW COMPLEXITY: 45127;PT RE-EVAL: 44266;PT THER PROC EA 15 MIN: 54248;PT THER ACT EA 15 MIN: 31015;PT MANUAL THERAPY EA 15 MIN: 34526;PT NEUROMUSC RE-EDUCATION EA 15 MIN: 11203;PT SELF CARE/HOME MGMT/TRAIN EA 15: 59614;PT HOT OR COLD PACK TREAT MCARE  -ER     PT Plan Comments assess how pt has been since evaluation/HEP compliance. UBE warm up and review HEP. Consider DDN, vestibular eval, supine chin tuck, chin tuck with rotation, chin tuck with lift, seated LS stretch, theracane, alternating supine shoulder flex over noodle/towel, cat cow with head follow, SB rollout, suboccipital release, STM to bilat UT, s/l open books, use moist heat in dark room if needed?  -ER               User Key  (r) = Recorded By, (t) = Taken By, (c) = Cosigned By      Initials Name Provider Type    ER Lindsey Barrow, PT Physical Therapist                       OP Exercises       Row Name 06/11/24 1200             Total Minutes    70439 - PT Therapeutic Exercise Minutes 10  -ER         Exercise 1    Exercise Name 1 UBE  -ER      Additional Comments next visit  -ER         Exercise 2    Exercise Name 2 Pec doorway stretch  -ER      Cueing 2 Verbal;Demo  -ER      Reps 2 3  -ER      Time 2 20  -ER      Additional Comments low position  -ER         Exercise 3    Exercise Name 3 Seated UT stretch/ flex/ext AROM  -ER      Cueing 3 Verbal;Demo  -ER      Reps 3 3  -ER      Time 3 20e  -ER         Exercise 4    Exercise Name 4 Scap Retractions  -ER      Cueing 4 Verbal;Demo  -ER      Sets 4 1  -ER      Reps 4 10  -ER      Time 4 10sec  -ER                User Key  (r) = Recorded By, (t) = Taken By, (c) = Cosigned By      Initials Name Provider Type    ER Danial,  Lindsey PT Physical Therapist                                  Outcome Measure Options: Neck Disability Index (NDI)  Neck Disability Index  Section 1 - Pain Intensity: The pain comes and goes and is moderate.  Section 2 - Personal Care: It is painful to look after myself, and I am slow and careful.  Section 3 - Lifting: Pain prevents me from lifting heavy weights, but I can manage light to medium weights if they are conveniently positioned.  Section 4 - Reading: I can read as much as I want with moderate neck pain.  Section 5 - Headaches: I have headaches almost all the time.  Section 6 - Concentration: I have a great deal of difficulty in concentrating when I want to.  Section 7 - Work: I cannot do my usual work.  Section 8 - Driving: I cannot drive my car as long as I want because of moderate neck pain.  Section 9 - Sleeping: My sleep is moderately disturbed (2-3 hours sleepless).  Section 10 - Recreation: I am able to engage in a few of my usual recreational activities because of pain in my neck.  Neck Disability Index Score: 30  Neck Disability Index Comments: 30/50= 60%      Time Calculation:     Start Time: 0730  Stop Time: 0815  Time Calculation (min): 45 min  Total Timed Code Minutes- PT: 10 minute(s)  Timed Charges  69635 - PT Therapeutic Exercise Minutes: 10  Untimed Charges  PT Eval/Re-eval Minutes: 35  Total Minutes  Timed Charges Total Minutes: 10  Untimed Charges Total Minutes: 35   Total Minutes: 45     Therapy Charges for Today       Code Description Service Date Service Provider Modifiers Qty    01069354055 HC PT THER PROC EA 15 MIN 6/11/2024 Lindsey Barrow, PT GP 1    [id number] HC PT EVAL LOW COMPLEXITY 2 6/11/2024 Lindsey Barrow PT GP 1            PT G-Codes  Outcome Measure Options: Neck Disability Index (NDI)  Neck Disability Index Score: 30         Lindsey Barrow PT  6/11/2024

## 2024-06-12 ENCOUNTER — TELEPHONE (OUTPATIENT)
Dept: NEUROLOGY | Facility: CLINIC | Age: 49
End: 2024-06-12
Payer: COMMERCIAL

## 2024-06-12 NOTE — TELEPHONE ENCOUNTER
Had Left , Bessyt (if applicable) & mailed reminder regarding appt reschedule due to provider being out of office.  Had scheduled pt for July 9th at 8:30 AM.

## 2024-06-19 ENCOUNTER — HOSPITAL ENCOUNTER (OUTPATIENT)
Dept: PHYSICAL THERAPY | Facility: HOSPITAL | Age: 49
Setting detail: THERAPIES SERIES
Discharge: HOME | End: 2024-06-19
Payer: COMMERCIAL

## 2024-06-19 DIAGNOSIS — M54.81 OCCIPITAL NEURALGIA OF LEFT SIDE: ICD-10-CM

## 2024-06-19 DIAGNOSIS — G44.86 CERVICOGENIC HEADACHE: Primary | ICD-10-CM

## 2024-06-19 PROCEDURE — 97110 THERAPEUTIC EXERCISES: CPT

## 2024-06-19 PROCEDURE — 97140 MANUAL THERAPY 1/> REGIONS: CPT

## 2024-06-19 NOTE — THERAPY TREATMENT NOTE
Outpatient Physical Therapy Ortho Treatment Note  [facility]     Patient Name: Emelyn Lopez  : 1975  MRN: [id number]  Today's Date: 2024      Visit Date: 2024    Visit Dx:    ICD-10-CM ICD-9-CM   1. Cervicogenic headache  G44.86 784.0   2. Occipital neuralgia of left side  M54.81 723.8       There is no problem list on file for this patient.       Past Medical History:   Diagnosis Date    Anxiety     Cluster headache     Depression     Migraine     Seasonal allergies     Vertigo         Past Surgical History:   Procedure Laterality Date    EAR TUBES      MASTOIDECTOMY Left                         PT Assessment/Plan       Row Name 24 1600          PT Assessment    Assessment Comments Ms. Lopez returns to PT for first f/u since evaluation for cervicogenic headaches and intractable chronic migraines. Reports good compliance to HEP, benefiting majority from stretches. Spent time doing manual therapy techniques to suboccipital region and UT/LS, pt reporting good relief and no increase in symptoms. She did walk in with a headache, but reports that is a constant sensation. Followed with supine chin tucks, attempted supine chin tucks with rotation but pt became very dizzy (chronic vertigo), and LS stretch. Reviewed HEP, modifying scap retraction into a pain-free range to allow for better comfort. Updated HEP, discussed frequency of stretching daily. Pt may benefit from DDN in the future. At this time, she remains a good candidate for skilled PT.  -        PT Plan    PT Plan Comments assess pt tolerance to first full tx. continue manual as needed, discussion of DDN? consider reviewing towel SNAG, chin tuck with lift, supine HA, standing rows, shoulder ext?  -               User Key  (r) = Recorded By, (t) = Taken By, (c) = Cosigned By      Initials Name Provider Type    Sachin Martinez, PT Physical Therapist                       OP Exercises       Row Name 24 1600 24 1288           Subjective    Subjective Comments The one where you squeeze your shoulder blades has been pinchy. The other stretches are great.  -DR --        Total Minutes    75740 - PT Therapeutic Exercise Minutes --  -DR 25  -DR     18916 - PT Manual Therapy Minutes -- 20  -DR        Exercise 1    Exercise Name 1 UBE  -DR --     Time 1 4 min  -DR --     Additional Comments may do less time next visit, inc tightness  -DR --        Exercise 2    Exercise Name 2 Pec doorway stretch  -DR --     Cueing 2 Verbal;Demo  -DR --     Reps 2 3  -DR --     Time 2 20  -DR --     Additional Comments 60 deg  -DR --        Exercise 3    Exercise Name 3 Seated UT stretch/LS stretch  -DR --     Cueing 3 Verbal;Demo  -DR --     Reps 3 2e  -DR --     Time 3 20s  -DR --        Exercise 4    Exercise Name 4 Scap Retractions  -DR --     Cueing 4 Verbal;Demo  -DR --     Sets 4 1  -DR --     Reps 4 10  -DR --     Time 4 10sec  -DR --     Additional Comments pain-free  -DR --        Exercise 5    Exercise Name 5 supine chin tuck  -DR --     Cueing 5 Verbal;Demo  -DR --     Reps 5 15  -DR --     Time 5 5s  -DR --        Exercise 6    Exercise Name 6 supine chin tuck w/ rotation  -DR --     Cueing 6 Verbal;Demo  -DR --     Reps 6 4  -DR --     Time 6 had to discontinue d/t dizziness  -DR --        Exercise 7    Exercise Name 7 c/s SNAG  -DR --     Additional Comments next visit with towel  -DR --               User Key  (r) = Recorded By, (t) = Taken By, (c) = Cosigned By      Initials Name Provider Type    Sachin Martinez, PT Physical Therapist                             Manual Rx (Last 36 Hours)       Manual Treatments       Row Name 06/19/24 1500             Total Minutes    70734 - PT Manual Therapy Minutes 20  -DR         Manual Rx 1    Manual Rx 1 Location STM B UT TPR  -DR      Manual Rx 1 Type suboccipital release  -DR                User Key  (r) = Recorded By, (t) = Taken By, (c) = Cosigned By      Initials Name Provider Type      Sachin Hickey, PT Physical Therapist                     PT OP Goals       Row Name 06/19/24 1600          PT Short Term Goals    STG Date to Achieve 07/11/24  -     STG 1 Pt will be independent and verbalized good understanding of initial HEP to improve ability to manage pain, as well as improve strength and ROM.  -     STG 1 Progress Ongoing  -DR     STG 2 Pt to be educated in/verbalize understanding of the importance of posture/ergonomics in association with their condition to facilitate self management of their condition.  -     STG 2 Progress Ongoing  -     STG 3 Pt. will improve bilateral cervical rotation to 45 deg to assist with driving safely  -     STG 3 Progress Ongoing  -DR        Long Term Goals    LTG Date to Achieve 08/10/24  -     LTG 1 Pt will be independent and verbalized good understanding of advanced HEP to improve ability to manage pain, as well as improve strength and ROM.  -     LTG 1 Progress Ongoing  -     LTG 2 Patient will improve ability to sleep through the night with 0 sleep disturbances related to shoulder pain to improve overall sleep quality and quality of life.  -     LTG 2 Progress Ongoing  -     LTG 3 Pt will improve NDI score to at least 45% perceived disability to show overall improved quality of life.  -     LTG 3 Progress Ongoing  -     LTG 4 Pt will improve bilateral shoulder flexion strength to at least 4+/5.  -     LTG 4 Progress Ongoing  -               User Key  (r) = Recorded By, (t) = Taken By, (c) = Cosigned By      Initials Name Provider Type    Sachin Martinez, PT Physical Therapist                    Therapy Education  Education Details: updated HEP, discussed frequency of stretching in pain-free range  Given: HEP, Symptoms/condition management  Program: Reinforced, Progressed  How Provided: Verbal, Demonstration, Written  Provided to: Patient  Level of Understanding: Teach back education performed, Verbalized, Demonstrated               Time Calculation:   Start Time: 1600  Stop Time: 1645  Time Calculation (min): 45 min  Timed Charges  68543 - PT Therapeutic Exercise Minutes: 25  31814 - PT Manual Therapy Minutes: 20  Total Minutes  Timed Charges Total Minutes: 45   Total Minutes: 45  Therapy Charges for Today       Code Description Service Date Service Provider Modifiers Qty    53640256748 HC PT THER PROC EA 15 MIN 6/19/2024 Sachin Hickey, PT GP 2    03419073612 HC PT MANUAL THERAPY EA 15 MIN 6/19/2024 Sachin Hickey, PT GP 1                      Sachin Hickey, PT  6/19/2024

## 2024-06-25 ENCOUNTER — APPOINTMENT (OUTPATIENT)
Dept: PHYSICAL THERAPY | Facility: HOSPITAL | Age: 49
End: 2024-06-25
Payer: COMMERCIAL

## 2024-06-27 ENCOUNTER — APPOINTMENT (OUTPATIENT)
Dept: PHYSICAL THERAPY | Facility: HOSPITAL | Age: 49
End: 2024-06-27
Payer: COMMERCIAL

## 2024-07-05 ENCOUNTER — APPOINTMENT (OUTPATIENT)
Dept: PHYSICAL THERAPY | Facility: HOSPITAL | Age: 49
End: 2024-07-05
Payer: COMMERCIAL

## 2024-07-09 ENCOUNTER — OFFICE VISIT (OUTPATIENT)
Dept: NEUROLOGY | Facility: CLINIC | Age: 49
End: 2024-07-09
Payer: COMMERCIAL

## 2024-07-09 ENCOUNTER — APPOINTMENT (OUTPATIENT)
Dept: PHYSICAL THERAPY | Facility: HOSPITAL | Age: 49
End: 2024-07-09
Payer: COMMERCIAL

## 2024-07-09 VITALS
BODY MASS INDEX: 42.88 KG/M2 | HEART RATE: 86 BPM | WEIGHT: 242 LBS | DIASTOLIC BLOOD PRESSURE: 72 MMHG | SYSTOLIC BLOOD PRESSURE: 126 MMHG | OXYGEN SATURATION: 98 % | HEIGHT: 63 IN

## 2024-07-09 DIAGNOSIS — M54.2 NECK PAIN: ICD-10-CM

## 2024-07-09 DIAGNOSIS — G43.719 INTRACTABLE CHRONIC MIGRAINE WITHOUT AURA AND WITHOUT STATUS MIGRAINOSUS: Primary | ICD-10-CM

## 2024-07-09 PROCEDURE — 99213 OFFICE O/P EST LOW 20 MIN: CPT | Performed by: NURSE PRACTITIONER

## 2024-07-09 PROCEDURE — 1160F RVW MEDS BY RX/DR IN RCRD: CPT | Performed by: NURSE PRACTITIONER

## 2024-07-09 PROCEDURE — 1159F MED LIST DOCD IN RCRD: CPT | Performed by: NURSE PRACTITIONER

## 2024-07-09 RX ORDER — RIZATRIPTAN BENZOATE 10 MG/1
10 TABLET, ORALLY DISINTEGRATING ORAL ONCE AS NEEDED
Qty: 12 TABLET | Refills: 5 | Status: SHIPPED | OUTPATIENT
Start: 2024-07-09

## 2024-07-09 RX ORDER — TOPIRAMATE 50 MG/1
50 TABLET, FILM COATED ORAL 2 TIMES DAILY
Qty: 60 TABLET | Refills: 5 | Status: SHIPPED | OUTPATIENT
Start: 2024-07-09

## 2024-07-09 NOTE — PROGRESS NOTES
Subjective   Emelyn Lopez is a 49 y.o. female presenting for follow-up for headaches.  She has a history of anxiety and depression, seasonal allergies, neck pain, dizziness, and headaches.  She has had the headaches for several years but they have worsened over the past year.  At her last visit she reported an almost daily headache.  The pain is mostly left-sided but she does have headaches that occur over the entire head.  She has photophobia, phonophobia, and nausea.  She does also note chronic neck pain.  I referred her to physical therapy at her last visit.  Unfortunately she was not able to start this until this past week.  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  He passed away a few days ago and now she has the flexibility to attend these appointments.  I also started her on topiramate 50 mg twice a day.  She has seen a significant reduction in the frequency of her headaches with this.  She denies any side effects.  She sees psychiatry for her anxiety and depression and takes Celexa 30 mg daily.  She has been on amitriptyline but could not tolerate the side effects and therefore had to stop taking it.  I prescribed sumatriptan for her as well at her last visit.  This did improve her headache, however she said every time that she did get it worsened her neck pain and made her feel stiff in the neck and shoulder area.  She has taken a friend's Ubrelvy and did feel this worked very well for her.  She drinks plenty of water.  Previously was only getting 5 to 6 hours of sleep per night due to caring for her grandfather, however that likely will improve now given that she is no longer caring for him.    Migraine     Review of Systems   Neurological:  Positive for headache (doing better). Negative for dizziness, tremors, seizures, syncope, facial asymmetry, speech difficulty, weakness, light-headedness, numbness, memory problem and confusion.     I have reviewed and confirmed the  accuracy of the patient's history: Chief complaint, HPI, ROS, Subjective, and Past Family Social History as entered by the MA/LPN/RN. Elizabeth Hanna MA 07/09/24      Objective     Physical Examination:  General Appearance:  Well developed, well nourished, well groomed, alert, and cooperative.  HEENT: Normocephalic.    Neck and Spine: Normal range of motion.  Normal alignment. No mass or tenderness. No bruits.  Cardiac: Regular rate and rhythm. No murmurs.  Peripheral Vasculature: Radial and pedal pulses are equal and symmetric.   Extremities: No edema or deformities. Normal joint ROM.  Skin: No rashes or birth marks.      Neurological examination:   Higher Integrative Function: Oriented to time, place, and person. Normal registration, recall attention span and concentration. Normal language including comprehension, spontaneous speech, repetition, reading, writing, naming and vocabulary. No neglect with normal visual-spatial function and construction. Normal fund of knowledge and higher integrative function.   CN II: Pupils are equal, round and reactive to light. Normal visual acuity and visual fields.   CN III IV VI: Extraocular movements are full without nystagmus.   CN V: Normal facial sensation and strength of muscles of mastication.   CN VII: Facial movements are symmetric. No weakness.   CN VIII: Auditory acuity is normal.  CN IX & X: Symmetric palatal movement.   CN XI: Sternocleidomastoid and trapezius are normal. No weakness.   CN XII: The tongue is midline. No atrophy or fasciculations.  Motor: Normal muscle strength, bulk and tone in upper and lower extremities. No fasciculations, rigidity, spasticity, or abnormal movements.   Reflexes: 2+ in the upper and lower extremities. Plantar responses are flexor.   Sensation: Normal light touch, pinprick, vibration, temperature, and proprioception in the arms and legs.   Station and Gait: Normal gait and station.   Coordination: Finger to nose test shows no  dysmetria. Rapid alternating movements are normal. Heel to shin is normal.           Assessment & Plan   Diagnoses and all orders for this visit:    1. Intractable chronic migraine without aura and without status migrainosus (Primary)    2. Neck pain    Other orders  -     rizatriptan MLT (MAXALT-MLT) 10 MG disintegrating tablet; Place 1 tablet on the tongue 1 (One) Time As Needed for Migraine. May repeat in 2 hours if needed. Not to exceed 2 tablets in 24  hours.  Dispense: 12 tablet; Refill: 5  -     topiramate (TOPAMAX) 50 MG tablet; Take 1 tablet by mouth 2 (Two) Times a Day.  Dispense: 60 tablet; Refill: 5    Her headaches have improved with topiramate 50 mg twice a day.  She will continue this and now is able to start physical therapy for her neck pain as well.  She is currently going twice a week for the next several weeks.  She had some neck stiffness associated with the sumatriptan and therefore we will try rizatriptan instead.  Side effects were discussed.  If she does not tolerate this well hopefully at that point we can get Ubrelvy approved.  She has tried this in the past and it did work well for her.  She will call with any problems, otherwise is to follow-up in 6 months.

## 2024-07-11 ENCOUNTER — APPOINTMENT (OUTPATIENT)
Dept: PHYSICAL THERAPY | Facility: HOSPITAL | Age: 49
End: 2024-07-11
Payer: COMMERCIAL

## 2024-07-17 ENCOUNTER — HOSPITAL ENCOUNTER (OUTPATIENT)
Dept: PHYSICAL THERAPY | Facility: HOSPITAL | Age: 49
Setting detail: THERAPIES SERIES
Discharge: HOME | End: 2024-07-17
Payer: COMMERCIAL

## 2024-07-17 DIAGNOSIS — G44.86 CERVICOGENIC HEADACHE: Primary | ICD-10-CM

## 2024-07-17 DIAGNOSIS — M54.81 OCCIPITAL NEURALGIA OF LEFT SIDE: ICD-10-CM

## 2024-07-17 PROCEDURE — 97140 MANUAL THERAPY 1/> REGIONS: CPT

## 2024-07-17 PROCEDURE — 97110 THERAPEUTIC EXERCISES: CPT

## 2024-07-17 NOTE — THERAPY PROGRESS REPORT/RE-CERT
Outpatient Physical Therapy Ortho Progress Note  [facility]     Patient Name: Emelyn Lopez  : 1975  MRN: [id number]  Today's Date: 2024      Visit Date: 2024    Visit Dx:    ICD-10-CM ICD-9-CM   1. Cervicogenic headache  G44.86 784.0   2. Occipital neuralgia of left side  M54.81 723.8       There is no problem list on file for this patient.       Past Medical History:   Diagnosis Date    Anxiety     Cluster headache     Depression     Migraine     Seasonal allergies     Vertigo         Past Surgical History:   Procedure Laterality Date    EAR TUBES      MASTOIDECTOMY Left                         PT Assessment/Plan       Row Name 24 1700          PT Assessment    Functional Limitations Limitation in home management;Limitations in community activities;Performance in leisure activities;Performance in self-care ADL;Performance in work activities  -MO     Impairments Sensation;Range of motion;Posture;Pain;Muscle strength;Joint mobility;Joint integrity  -MO     Assessment Comments Emelyn Lopez has been seen for 3 physical therapy sessions including evaluation for cervicogenic headaches.  Treatment has included therapeutic exercise, manual therapy, and patient education with home exercise program . Progress to physical therapy goals is slow secondary to limited visits however pt has had several extenuating personal factors that have impacted ability to come to therapy sessions. Pt has met 0/3 STG and 0/4 LTG at this time. She reports pain is near baseline status, has not increased significantly. Spent additional time on manual this date to provide soft tissue work to BL upper traps and suboccipital tissue for overall pain relief. Additionally of note, she reports chronic vertigo symptoms with significant hypofunction. She demos onset of dizziness throughout session with all head and trunk movement and would benefit from further vestibular workup in future sessions to address as it  significantly impacts her overall functional mobility. She will benefit from continued skilled physical therapy to address remaining impairments and functional limitations.  -MO     Please refer to paper survey for additional self-reported information No  -MO     Rehab Potential Good  -MO     Patient/caregiver participated in establishment of treatment plan and goals Yes  -MO     Patient would benefit from skilled therapy intervention Yes  -MO        PT Plan    PT Frequency 2x/week;1x/week  -MO     Predicted Duration of Therapy Intervention (PT) 8-10 visits  -MO     Planned CPT's? PT RE-EVAL: 97431;PT THER PROC EA 15 MIN: 82701;PT THER ACT EA 15 MIN: 67078;PT MANUAL THERAPY EA 15 MIN: 17695;PT NEUROMUSC RE-EDUCATION EA 15 MIN: 18131;PT GAIT TRAINING EA 15 MIN: 28483;PT SELF CARE/HOME MGMT/TRAIN EA 15: 11719;PT CANALITH REPOSITIONIN  -MO     PT Plan Comments how was last session? continue manual if beneficial. chin tuck + lift, consider adding small pool noodle to supine exercises if tolerable. Doorway pec stretch  -MO               User Key  (r) = Recorded By, (t) = Taken By, (c) = Cosigned By      Initials Name Provider Type    Jacque Valdovinos, PT Physical Therapist                       OP Exercises       Row Name 24 1600             Precautions    Existing Precautions/Restrictions other (see comments)   chronic dizziness. Limit excessive head rotation and fast movements  -MO         Subjective    Subjective Comments Really just haven't been able to do exercises. 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 Feel like back to baseline really  -MO         Total Minutes    88548 - PT Therapeutic Exercise Minutes 15  -MO      19903 - PT Therapeutic Activity Minutes 5  -MO      05014 - PT Manual Therapy Minutes 25  -MO         Exercise 4    Exercise Name 4 Scap Retractions  -MO      Cueing 4 Verbal  -MO      Sets 4 1  -MO      Reps 4 10  -MO      Time 4  10sec  -MO         Exercise 5    Exercise Name 5 supine chin tuck  -MO      Cueing 5 Verbal;Demo  -MO      Sets 5 1  -MO      Reps 5 10  -MO      Time 5 5s  -MO         Exercise 7    Exercise Name 7 supine rotation stretch  -MO      Cueing 7 Verbal  -MO      Sets 7 2B  -MO      Time 7 30s  -MO      Additional Comments SNAG without the towel-- pt using hand to provide overpressure into rotation  -MO         Exercise 8    Exercise Name 8 Supine HA  -MO      Cueing 8 Verbal;Demo  -MO      Sets 8 2  -MO      Reps 8 10  -MO      Time 8 GTB  -MO         Exercise 9    Exercise Name 9 Gaze stabilization: nods and turns  -MO      Cueing 9 Verbal;Demo  -MO      Sets 9 2e  -MO      Time 9 30s  -MO      Additional Comments onset of dizziness. Provided new HEP  -MO                User Key  (r) = Recorded By, (t) = Taken By, (c) = Cosigned By      Initials Name Provider Type    Jacque Valdovinos, PT Physical Therapist                             Manual Rx (Last 36 Hours)       Manual Treatments       Row Name 07/17/24 1600 07/17/24 1500          Total Minutes    88912 - PT Manual Therapy Minutes 25  -MO --        Manual Rx 1    Manual Rx 1 Location -- cspine  -MO     Manual Rx 1 Type -- suboccipital release, gentle distraction, BL UT STM with sustianed holds  -MO               User Key  (r) = Recorded By, (t) = Taken By, (c) = Cosigned By      Initials Name Provider Type    Jacque Valdovinos, PT Physical Therapist                     PT OP Goals       Row Name 07/17/24 1700          PT Short Term Goals    STG Date to Achieve 07/11/24  -MO     STG 1 Pt will be independent and verbalized good understanding of initial HEP to improve ability to manage pain, as well as improve strength and ROM.  -MO     STG 1 Progress Ongoing  -MO     STG 2 Pt to be educated in/verbalize understanding of the importance of posture/ergonomics in association with their condition to facilitate self management of their condition.  -MO     STG 2 Progress  Ongoing  -MO     STG 3 Pt. will improve bilateral cervical rotation to 45 deg to assist with driving safely  -MO     STG 3 Progress Ongoing  -MO        Long Term Goals    LTG Date to Achieve 08/10/24  -MO     LTG 1 Pt will be independent and verbalized good understanding of advanced HEP to improve ability to manage pain, as well as improve strength and ROM.  -MO     LTG 1 Progress Ongoing  -MO     LTG 2 Patient will improve ability to sleep through the night with 0 sleep disturbances related to shoulder pain to improve overall sleep quality and quality of life.  -MO     LTG 2 Progress Ongoing  -MO     LTG 3 Pt will improve NDI score to at least 45% perceived disability to show overall improved quality of life.  -MO     LTG 3 Progress Ongoing  -MO     LTG 4 Pt will improve bilateral shoulder flexion strength to at least 4+/5.  -MO     LTG 4 Progress Ongoing  -MO               User Key  (r) = Recorded By, (t) = Taken By, (c) = Cosigned By      Initials Name Provider Type    Jacque Valdovinos PT Physical Therapist                                   Time Calculation:   Start Time: 1630  Stop Time: 1715  Time Calculation (min): 45 min  Timed Charges  44251 - PT Therapeutic Exercise Minutes: 15  83582 - PT Manual Therapy Minutes: 25  19686 - PT Therapeutic Activity Minutes: 5  Total Minutes  Timed Charges Total Minutes: 45   Total Minutes: 45  Therapy Charges for Today       Code Description Service Date Service Provider Modifiers Qty    28300150800 HC PT THER PROC EA 15 MIN 7/17/2024 Jacque Solorzano PT GP 1    88194194169 HC PT MANUAL THERAPY EA 15 MIN 7/17/2024 Jacque Solorzano PT GP 2                      Jacque Solorzano PT  7/17/2024

## 2024-07-19 ENCOUNTER — HOSPITAL ENCOUNTER (OUTPATIENT)
Dept: PHYSICAL THERAPY | Facility: HOSPITAL | Age: 49
Setting detail: THERAPIES SERIES
Discharge: HOME | End: 2024-07-19
Payer: COMMERCIAL

## 2024-07-19 DIAGNOSIS — M54.81 OCCIPITAL NEURALGIA OF LEFT SIDE: ICD-10-CM

## 2024-07-19 DIAGNOSIS — G44.86 CERVICOGENIC HEADACHE: Primary | ICD-10-CM

## 2024-07-19 PROCEDURE — 97110 THERAPEUTIC EXERCISES: CPT

## 2024-07-19 NOTE — THERAPY TREATMENT NOTE
"  Outpatient Physical Therapy Ortho Treatment Note  [facility]     Patient Name: Emelyn Lopez  : 1975  MRN: [id number]  Today's Date: 2024      Visit Date: 2024    Visit Dx:    ICD-10-CM ICD-9-CM   1. Cervicogenic headache  G44.86 784.0   2. Occipital neuralgia of left side  M54.81 723.8       There is no problem list on file for this patient.       Past Medical History:   Diagnosis Date    Anxiety     Cluster headache     Depression     Migraine     Seasonal allergies     Vertigo         Past Surgical History:   Procedure Laterality Date    EAR TUBES      MASTOIDECTOMY Left                         PT Assessment/Plan       Row Name 24 1600          PT Assessment    Assessment Comments Emelyn Lopez is a 49 year old female returning for physical therapy treatment session for cervicogenic headaches, reporting overall non compliance to HEP due to extenuating circumstances in personal life. She is mostly recently dealing with a death in the family. Today, transitioned to seated HA with GTB this date. Pt. Initially demonstrating wrist compensations causing N/T at base of R c-spine. With cues to keep wrists in neutral, N/T subsides immediately. Also trialed seated chin tucks with good response. She notes no further increases in pain, and improved posture throughout both C/s and T/s. Pt. Remains a good candidate for skilled PT.  -ER        PT Plan    PT Plan Comments May consider setting up vestibular work up/eval, pt. reports she has \"train whistle\" in her ear, consider chin tuck with lift, add small noodle to supine ex, supine theraloop or supine alt shoulder flexion?  -ER               User Key  (r) = Recorded By, (t) = Taken By, (c) = Cosigned By      Initials Name Provider Type    ER Lindsey Barrow, PT Physical Therapist                       OP Exercises       Row Name 24 1600             Precautions    Existing Precautions/Restrictions other (see comments)  -ER         Subjective    " Subjective Comments My neck is a pain in the butt today. I just returned to work at a desk, so I needed to take some tylenol.  -ER         Subjective Pain    Subjective Pain Comment does not rate numerically, however notes that pain is increased secondary to returning to work.  -ER         Total Minutes    44312 - PT Therapeutic Exercise Minutes 40  -ER         Exercise 1    Exercise Name 1 UBE  -ER      Time 1 4 min  -ER         Exercise 2    Exercise Name 2 Pec doorway stretch  -ER      Cueing 2 Verbal;Demo  -ER      Reps 2 3  -ER      Time 2 20  -ER      Additional Comments 60 deg  -ER         Exercise 3    Exercise Name 3 Seated UT stretch/LS stretch  -ER      Cueing 3 Verbal;Demo  -ER      Reps 3 3e  -ER      Time 3 20s  -ER         Exercise 4    Exercise Name 4 Scap Retractions  -ER      Cueing 4 Verbal  -ER      Sets 4 1  -ER      Reps 4 10  -ER      Time 4 10sec  -ER         Exercise 5    Exercise Name 5 Seated Chin tuck  -ER      Cueing 5 Verbal;Demo  -ER      Sets 5 1  -ER      Reps 5 10  -ER      Time 5 5sec  -ER         Exercise 7    Exercise Name 7 supine rotation stretch  -ER      Cueing 7 Verbal  -ER      Sets 7 2B  -ER      Time 7 30s  -ER      Additional Comments SNAG without the towel, overpressure with hand  -ER         Exercise 8    Exercise Name 8 Seated HA  -ER      Cueing 8 Verbal;Demo  -ER      Sets 8 2  -ER      Reps 8 10  -ER      Time 8 GTB  -ER         Exercise 10    Exercise Name 10 LS stretch  -ER      Cueing 10 Verbal;Demo  -ER      Reps 10 3  -ER      Time 10 20sec  -ER      Additional Comments cued to move head slowly through range to avoid dizziness  -ER                User Key  (r) = Recorded By, (t) = Taken By, (c) = Cosigned By      Initials Name Provider Type    ER Lindsey Barrow, PT Physical Therapist                                  PT OP Goals       Row Name 07/19/24 1600          PT Short Term Goals    STG Date to Achieve 07/11/24  -ER     STG 1 Pt will be independent and  verbalized good understanding of initial HEP to improve ability to manage pain, as well as improve strength and ROM.  -ER     STG 1 Progress Ongoing  -ER     STG 2 Pt to be educated in/verbalize understanding of the importance of posture/ergonomics in association with their condition to facilitate self management of their condition.  -ER     STG 2 Progress Ongoing  -ER     STG 3 Pt. will improve bilateral cervical rotation to 45 deg to assist with driving safely  -ER     STG 3 Progress Ongoing  -ER        Long Term Goals    LTG Date to Achieve 08/10/24  -ER     LTG 1 Pt will be independent and verbalized good understanding of advanced HEP to improve ability to manage pain, as well as improve strength and ROM.  -ER     LTG 1 Progress Ongoing  -ER     LTG 2 Patient will improve ability to sleep through the night with 0 sleep disturbances related to shoulder pain to improve overall sleep quality and quality of life.  -ER     LTG 2 Progress Ongoing  -ER     LTG 3 Pt will improve NDI score to at least 45% perceived disability to show overall improved quality of life.  -ER     LTG 3 Progress Ongoing  -ER     LTG 4 Pt will improve bilateral shoulder flexion strength to at least 4+/5.  -ER     LTG 4 Progress Ongoing  -ER               User Key  (r) = Recorded By, (t) = Taken By, (c) = Cosigned By      Initials Name Provider Type    ER Lindsey Barrow PT Physical Therapist                    Therapy Education  Education Details: Reinforced HEP  Given: HEP  Program: Reinforced  How Provided: Verbal, Demonstration  Provided to: Patient  Level of Understanding: Teach back education performed, Verbalized, Demonstrated              Time Calculation:   Start Time: 1610  Stop Time: 1650  Time Calculation (min): 40 min  Total Timed Code Minutes- PT: 40 minute(s)  Timed Charges  67174 - PT Therapeutic Exercise Minutes: 40  Total Minutes  Timed Charges Total Minutes: 40   Total Minutes: 40  Therapy Charges for Today       Code  Description Service Date Service Provider Modifiers Qty    35181219703 HC PT THER PROC EA 15 MIN 7/19/2024 Lindsey Barrow, PT GP 3                      Lindsey Barrow, PT  7/19/2024

## 2024-07-24 ENCOUNTER — HOSPITAL ENCOUNTER (OUTPATIENT)
Dept: PHYSICAL THERAPY | Facility: HOSPITAL | Age: 49
Setting detail: THERAPIES SERIES
Discharge: HOME | End: 2024-07-24
Payer: COMMERCIAL

## 2024-07-24 DIAGNOSIS — M54.81 OCCIPITAL NEURALGIA OF LEFT SIDE: ICD-10-CM

## 2024-07-24 DIAGNOSIS — G44.86 CERVICOGENIC HEADACHE: Primary | ICD-10-CM

## 2024-07-24 PROCEDURE — 97140 MANUAL THERAPY 1/> REGIONS: CPT

## 2024-07-24 PROCEDURE — 97110 THERAPEUTIC EXERCISES: CPT

## 2024-07-24 NOTE — THERAPY TREATMENT NOTE
Outpatient Physical Therapy Ortho Treatment Note  [facility]     Patient Name: Emelyn Lopez  : 1975  MRN: [id number]  Today's Date: 2024      Visit Date: 2024    Visit Dx:    ICD-10-CM ICD-9-CM   1. Cervicogenic headache  G44.86 784.0   2. Occipital neuralgia of left side  M54.81 723.8       There is no problem list on file for this patient.       Past Medical History:   Diagnosis Date    Anxiety     Cluster headache     Depression     Migraine     Seasonal allergies     Vertigo         Past Surgical History:   Procedure Laterality Date    EAR TUBES      MASTOIDECTOMY Left                         PT Assessment/Plan       Row Name 24 1700          PT Assessment    Assessment Comments Emelyn returns to PT with a newly diagnosed ear infection, which has severely impacted dizziness. Avoided repetitive changes in position, with all exercises completed in sitting and standing. Progressed session with adding rows and shoulder extension to further strengthen dorsal scapular region for support of cervical spine. No difficulty or c/o discomfort with additions today. Ended session with manual techniques to address trigger points and suboccipital release. Will continue to address vestibular issues in future visits. Pt remains appropriate for skilled PT at this time.  -        PT Plan    PT Plan Comments continue vestibular exs and manual therapy to cervical region; consider chin tuck with lift, supine theraloop or supine alt shoulder flexion?  -               User Key  (r) = Recorded By, (t) = Taken By, (c) = Cosigned By      Initials Name Provider Type    Sachin Martinez, PT Physical Therapist                       OP Exercises       Row Name 24 1700 24 1600          Precautions    Existing Precautions/Restrictions -- other (see comments)  chronic dizziness. Limit excessive head rotation and fast movements  -        Subjective    Subjective Comments -- Definitely more pain  since starting back at work. But I currently have an ear infection.  -DR        Total Minutes    78643 - PT Therapeutic Exercise Minutes -- 35  -DR     63395 - PT Manual Therapy Minutes --  -DR 14  -DR        Exercise 1    Exercise Name 1 -- UBE  -DR     Time 1 -- 4 min  -DR        Exercise 2    Exercise Name 2 -- Pec doorway stretch  -DR     Cueing 2 -- Verbal;Demo  -DR     Reps 2 -- 3  -DR     Time 2 -- 20  -DR        Exercise 3    Exercise Name 3 -- Seated UT stretch/LS stretch  -DR     Cueing 3 -- Verbal;Demo  -DR     Reps 3 -- 3e  -DR     Time 3 -- 20s  -DR        Exercise 4    Exercise Name 4 -- Scap Retractions  -DR     Cueing 4 -- Verbal  -DR     Sets 4 -- 1  -DR     Reps 4 -- 10  -DR     Time 4 -- 10sec  -DR        Exercise 5    Exercise Name 5 -- Seated Chin tuck  -DR     Cueing 5 -- Verbal;Demo  -DR     Sets 5 -- 1  -DR     Reps 5 -- 15  -DR     Time 5 -- 5s  -DR        Exercise 6    Exercise Name 6 -- rows/shldr ext  -DR     Cueing 6 -- Verbal;Demo  -DR     Sets 6 -- 2e  -DR     Reps 6 -- 10  -DR     Time 6 -- GTB/RTB  -DR        Exercise 8    Exercise Name 8 -- Seated HA  -DR     Cueing 8 -- Verbal;Demo  -DR     Sets 8 -- 2  -DR     Reps 8 -- 10  -DR     Time 8 -- GTB  -DR               User Key  (r) = Recorded By, (t) = Taken By, (c) = Cosigned By      Initials Name Provider Type    Sachin Martinez, PT Physical Therapist                             Manual Rx (Last 36 Hours)       Manual Treatments       Row Name 07/24/24 1700 07/24/24 1600          Total Minutes    82511 - PT Manual Therapy Minutes --  -DR 14  -DR        Manual Rx 1    Manual Rx 1 Location cspine  -DR --     Manual Rx 1 Type suboccipital release, gentle distraction, BL UT STM with sustianed holds  -DR --               User Key  (r) = Recorded By, (t) = Taken By, (c) = Cosigned By      Initials Name Provider Type    Sachin Martinez, PT Physical Therapist                     PT OP Goals       Row Name 07/24/24 1600          PT  Short Term Goals    STG Date to Achieve 07/11/24  -     STG 1 Pt will be independent and verbalized good understanding of initial HEP to improve ability to manage pain, as well as improve strength and ROM.  -     STG 1 Progress Ongoing  -     STG 2 Pt to be educated in/verbalize understanding of the importance of posture/ergonomics in association with their condition to facilitate self management of their condition.  -     STG 2 Progress Ongoing  -     STG 3 Pt. will improve bilateral cervical rotation to 45 deg to assist with driving safely  -     STG 3 Progress Ongoing  -DR        Long Term Goals    LTG Date to Achieve 08/10/24  -     LTG 1 Pt will be independent and verbalized good understanding of advanced HEP to improve ability to manage pain, as well as improve strength and ROM.  -     LTG 1 Progress Ongoing  -     LTG 2 Patient will improve ability to sleep through the night with 0 sleep disturbances related to shoulder pain to improve overall sleep quality and quality of life.  -     LTG 2 Progress Ongoing  -     LTG 3 Pt will improve NDI score to at least 45% perceived disability to show overall improved quality of life.  -     LTG 3 Progress Ongoing  -     LTG 4 Pt will improve bilateral shoulder flexion strength to at least 4+/5.  -     LTG 4 Progress Ongoing  -               User Key  (r) = Recorded By, (t) = Taken By, (c) = Cosigned By      Initials Name Provider Type    Sachin Martinez, PT Physical Therapist                    Therapy Education  Given: HEP  Program: Reinforced  How Provided: Verbal, Demonstration  Provided to: Patient  Level of Understanding: Teach back education performed, Verbalized, Demonstrated              Time Calculation:   Start Time: 1641  Stop Time: 1730  Time Calculation (min): 49 min  Timed Charges  74928 - PT Therapeutic Exercise Minutes: 35  82378 - PT Manual Therapy Minutes: 14  Total Minutes  Timed Charges Total Minutes: 49   Total  Minutes: 49  Therapy Charges for Today       Code Description Service Date Service Provider Modifiers Qty    16333592033  PT THER PROC EA 15 MIN 7/24/2024 Sachin Hickey, PT GP 2    95143399323 HC PT MANUAL THERAPY EA 15 MIN 7/24/2024 Sachin Hickey, PT GP 1                      Sachin Hickey, PT  7/24/2024

## 2024-07-26 ENCOUNTER — HOSPITAL ENCOUNTER (OUTPATIENT)
Dept: PHYSICAL THERAPY | Facility: HOSPITAL | Age: 49
Setting detail: THERAPIES SERIES
Discharge: HOME | End: 2024-07-26
Payer: COMMERCIAL

## 2024-07-26 DIAGNOSIS — M54.81 OCCIPITAL NEURALGIA OF LEFT SIDE: ICD-10-CM

## 2024-07-26 DIAGNOSIS — G44.86 CERVICOGENIC HEADACHE: Primary | ICD-10-CM

## 2024-07-26 PROCEDURE — 97140 MANUAL THERAPY 1/> REGIONS: CPT

## 2024-07-26 PROCEDURE — 97110 THERAPEUTIC EXERCISES: CPT

## 2024-07-26 NOTE — THERAPY TREATMENT NOTE
Outpatient Physical Therapy Ortho Treatment Note  [facility]     Patient Name: Emelyn Lopez  : 1975  MRN: [id number]  Today's Date: 2024      Visit Date: 2024    Visit Dx:    ICD-10-CM ICD-9-CM   1. Cervicogenic headache  G44.86 784.0   2. Occipital neuralgia of left side  M54.81 723.8       There is no problem list on file for this patient.       Past Medical History:   Diagnosis Date    Anxiety     Cluster headache     Depression     Migraine     Seasonal allergies     Vertigo         Past Surgical History:   Procedure Laterality Date    EAR TUBES      MASTOIDECTOMY Left                         PT Assessment/Plan       Row Name 24 1600          PT Assessment    Assessment Comments Ms. Dunaway returns to PT this date denying any change in symptoms since the last session. Feels like starting back to work this week has increased discomfort. Dizziness more controlled this date, returned to supine exercises to limit UT involvement and initiated several exercises over pool noodle to sustained anterior chest stretch and relative soft tissue work to nina-scap musculature. Added BL shoulder flex with stretch, supine theraloop flex, and chin tuck + lift all with good tolerance. Cueing for full cervical relaxation between lifts. Will assess tolerance at next session and progress as appropriate.  -MO        PT Plan    PT Plan Comments how were exercises? pull noodle to all supine-resisted open field goals, wall walks vertical and horizontal  -MO               User Key  (r) = Recorded By, (t) = Taken By, (c) = Cosigned By      Initials Name Provider Type    Jacque Valdovinos, PT Physical Therapist                       OP Exercises       Row Name 24 1600             Precautions    Existing Precautions/Restrictions other (see comments)  chronic dizziness. Limit excessive head rotation and fast movements  -MO         Subjective    Subjective Comments Work I think is just making this worse  -MO          Total Minutes    89812 - PT Therapeutic Exercise Minutes 30  -MO      39863 - PT Manual Therapy Minutes 15  -MO         Exercise 3    Exercise Name 3 seated UT stretch  -MO      Cueing 3 Verbal  -MO      Reps 3 2e  -MO      Time 3 20s  -MO         Exercise 5    Exercise Name 5 supine chin tuck  -MO      Cueing 5 Verbal  -MO      Sets 5 1  -MO      Reps 5 10  -MO      Time 5 5s  -MO         Exercise 7    Exercise Name 7 supine theraloop flex  -MO      Cueing 7 Verbal;Demo  -MO      Sets 7 2  -MO      Reps 7 10  -MO      Time 7 RTB  -MO         Exercise 8    Exercise Name 8 supine HA  -MO      Cueing 8 Verbal  -MO      Sets 8 2  -MO      Reps 8 10  -MO      Time 8 GTB  -MO         Exercise 11    Exercise Name 11 supine shoulder flex w/ hold  -MO      Cueing 11 Verbal;Demo  -MO      Sets 11 BL hands together  -MO      Reps 11 5  -MO      Time 11 10s  -MO      Additional Comments over noodle  -MO         Exercise 12    Exercise Name 12 supine tuck + lift  -MO      Cueing 12 Verbal;Demo  -MO      Reps 12 1x10  -MO      Time 12 cueing for full relaxation betwwen each rep  -MO      Additional Comments over noodle  -MO                User Key  (r) = Recorded By, (t) = Taken By, (c) = Cosigned By      Initials Name Provider Type    Jacque Valdovinos PT Physical Therapist                             Manual Rx (Last 36 Hours)       Manual Treatments       Row Name 07/26/24 1600 07/26/24 1500          Total Minutes    69173 - PT Manual Therapy Minutes 15  -MO --        Manual Rx 1    Manual Rx 1 Location -- cspine  -MO     Manual Rx 1 Type -- suboccipital release, gentle distraction, BL UT STM with sustianed holds  -MO               User Key  (r) = Recorded By, (t) = Taken By, (c) = Cosigned By      Initials Name Provider Type    Jacque Valdovinos PT Physical Therapist                                       Time Calculation:   Start Time: 1600  Stop Time: 1645  Time Calculation (min): 45 min  Timed Charges  84346 - PT  Therapeutic Exercise Minutes: 30  01449 - PT Manual Therapy Minutes: 15  Total Minutes  Timed Charges Total Minutes: 45   Total Minutes: 45  Therapy Charges for Today       Code Description Service Date Service Provider Modifiers Qty    84894234773  PT THER PROC EA 15 MIN 7/26/2024 Jacque Solorzano, PT GP 2    [id number]  PT MANUAL THERAPY EA 15 MIN 7/26/2024 Jacque Solorzano, PT GP 1                      Jacque Solorzano, PEMA  7/26/2024

## 2024-07-31 ENCOUNTER — APPOINTMENT (OUTPATIENT)
Dept: PHYSICAL THERAPY | Facility: HOSPITAL | Age: 49
End: 2024-07-31
Payer: COMMERCIAL

## 2024-08-09 ENCOUNTER — APPOINTMENT (OUTPATIENT)
Dept: PHYSICAL THERAPY | Facility: HOSPITAL | Age: 49
End: 2024-08-09
Payer: COMMERCIAL

## 2024-08-12 ENCOUNTER — HOSPITAL ENCOUNTER (OUTPATIENT)
Dept: PHYSICAL THERAPY | Facility: HOSPITAL | Age: 49
Setting detail: THERAPIES SERIES
Discharge: HOME | End: 2024-08-12
Payer: COMMERCIAL

## 2024-08-12 DIAGNOSIS — G44.86 CERVICOGENIC HEADACHE: Primary | ICD-10-CM

## 2024-08-12 DIAGNOSIS — M54.81 OCCIPITAL NEURALGIA OF LEFT SIDE: ICD-10-CM

## 2024-08-12 PROCEDURE — 97140 MANUAL THERAPY 1/> REGIONS: CPT

## 2024-08-12 PROCEDURE — 97110 THERAPEUTIC EXERCISES: CPT

## 2024-08-12 NOTE — THERAPY TREATMENT NOTE
Outpatient Physical Therapy Ortho Treatment Note  [facility]     Patient Name: Emelyn Lopez  : 1975  MRN: [id number]  Today's Date: 2024      Visit Date: 2024    Visit Dx:    ICD-10-CM ICD-9-CM   1. Cervicogenic headache  G44.86 784.0   2. Occipital neuralgia of left side  M54.81 723.8       There is no problem list on file for this patient.       Past Medical History:   Diagnosis Date    Anxiety     Cluster headache     Depression     Migraine     Seasonal allergies     Vertigo         Past Surgical History:   Procedure Laterality Date    EAR TUBES      MASTOIDECTOMY Left                         PT Assessment/Plan       Row Name 24 1700          PT Assessment    Assessment Comments Ms. Dunaway returns to PT this date denying any lingering soreness/discomfort following last session however she has had additional health concerns causing poor overall compliance to HEP and worsening migraine and dizziness symptoms. She does report having some alleviation over the last 3 days with adjusted medications and rest. Resumed strengthening program with addition of noodle to all supine exercises as well as added supine resisted field goal openers, wall wash + lift, and horizontal resisted wall walks all with good tolerance. Encouraged continued rest tomorrow, will assess tolerance at next session and progress as appropriate.  -MO        PT Plan    PT Plan Comments SCM stretch. palpate above clavical, BL and extend neck. seated SNAG. vertical wall walks  -MO               User Key  (r) = Recorded By, (t) = Taken By, (c) = Cosigned By      Initials Name Provider Type    Jacque Valdovinos, PT Physical Therapist                       OP Exercises       Row Name 24 1600             Precautions    Existing Precautions/Restrictions other (see comments)  chronic dizziness. Limit excessive head rotation and fast movements  -MO         Subjective    Subjective Comments Rough 2 weeks. Have had the worst  dizziness and migranes. Really just laid in bed the last 3 days and finally getting some relief. Wasn't able to do my HEP really because of the dizziness  -MO         Total Minutes    43803 - PT Therapeutic Exercise Minutes 32  -MO      79584 - PT Manual Therapy Minutes 8  -MO         Exercise 7    Exercise Name 7 supine theraloop flex  -MO      Cueing 7 Verbal  -MO      Sets 7 1  -MO      Reps 7 20  -MO      Time 7 RTB  -MO      Additional Comments over noodle  -MO         Exercise 8    Exercise Name 8 supine HA  -MO      Cueing 8 Verbal  -MO      Sets 8 2  -MO      Reps 8 12  -MO      Time 8 GTB  -MO      Additional Comments over noodle  -MO         Exercise 9    Exercise Name 9 supine fieldgoal openers  -MO      Cueing 9 Verbal;Demo  -MO      Sets 9 2  -MO      Reps 9 12  -MO      Time 9 GTB  -MO      Additional Comments over noodle  -MO         Exercise 10    Exercise Name 10 wall slides + lift  -MO      Cueing 10 Verbal;Demo  -MO      Sets 10 1  -MO      Reps 10 10  -MO      Time 10 3s  -MO         Exercise 12    Exercise Name 12 supine tuck + lift  -MO      Cueing 12 Verbal  -MO      Reps 12 2x10  -MO      Time 12 cueing for full relaxation betwwen each rep  -MO      Additional Comments over noodle  -MO         Exercise 13    Exercise Name 13 wall walk  -MO      Cueing 13 Verbal;Demo  -MO      Reps 13 5 laps  -MO      Time 13 GTB  -MO                User Key  (r) = Recorded By, (t) = Taken By, (c) = Cosigned By      Initials Name Provider Type    Jacque Valdovinos, PT Physical Therapist                             Manual Rx (Last 36 Hours)       Manual Treatments       Row Name 08/12/24 1700 08/12/24 1600          Total Minutes    14761 - PT Manual Therapy Minutes -- 8  -MO        Manual Rx 1    Manual Rx 1 Location cspine  -MO --     Manual Rx 1 Type suboccipital release, gentle distraction, BL UT STM with sustianed holds  -MO --               User Key  (r) = Recorded By, (t) = Taken By, (c) = Cosigned By       Initials Name Provider Type    Jacque Valdovinos PT Physical Therapist                                       Time Calculation:   Start Time: 1630  Stop Time: 1710  Time Calculation (min): 40 min  Timed Charges  53233 - PT Therapeutic Exercise Minutes: 32  03978 - PT Manual Therapy Minutes: 8  Total Minutes  Timed Charges Total Minutes: 40   Total Minutes: 40  Therapy Charges for Today       Code Description Service Date Service Provider Modifiers Qty    71193328162 HC PT THER PROC EA 15 MIN 8/12/2024 Jacque Solorzano, PT GP 2    77524169259 HC PT MANUAL THERAPY EA 15 MIN 8/12/2024 Jacque Solorzano PT GP 1                      Jacque Solorzano PT  8/12/2024

## 2024-08-14 ENCOUNTER — HOSPITAL ENCOUNTER (EMERGENCY)
Facility: HOSPITAL | Age: 49
Discharge: HOME | End: 2024-08-14
Attending: EMERGENCY MEDICINE
Payer: COMMERCIAL

## 2024-08-14 ENCOUNTER — APPOINTMENT (OUTPATIENT)
Dept: PHYSICAL THERAPY | Facility: HOSPITAL | Age: 49
End: 2024-08-14
Payer: COMMERCIAL

## 2024-08-14 VITALS
HEIGHT: 63 IN | SYSTOLIC BLOOD PRESSURE: 104 MMHG | BODY MASS INDEX: 41.64 KG/M2 | TEMPERATURE: 98.3 F | DIASTOLIC BLOOD PRESSURE: 54 MMHG | HEART RATE: 56 BPM | OXYGEN SATURATION: 97 % | WEIGHT: 235 LBS | RESPIRATION RATE: 16 BRPM

## 2024-08-14 DIAGNOSIS — R51.9 ACUTE NONINTRACTABLE HEADACHE, UNSPECIFIED HEADACHE TYPE: Primary | ICD-10-CM

## 2024-08-14 PROCEDURE — 25810000003 SODIUM CHLORIDE 0.9 % SOLUTION: Performed by: PHYSICIAN ASSISTANT

## 2024-08-14 PROCEDURE — 25010000002 DIPHENHYDRAMINE PER 50 MG: Performed by: PHYSICIAN ASSISTANT

## 2024-08-14 PROCEDURE — 96374 THER/PROPH/DIAG INJ IV PUSH: CPT

## 2024-08-14 PROCEDURE — 25010000002 METOCLOPRAMIDE PER 10 MG: Performed by: PHYSICIAN ASSISTANT

## 2024-08-14 PROCEDURE — 25010000002 KETOROLAC TROMETHAMINE PER 15 MG: Performed by: PHYSICIAN ASSISTANT

## 2024-08-14 PROCEDURE — 96375 TX/PRO/DX INJ NEW DRUG ADDON: CPT

## 2024-08-14 PROCEDURE — 99283 EMERGENCY DEPT VISIT LOW MDM: CPT

## 2024-08-14 RX ORDER — METOCLOPRAMIDE HYDROCHLORIDE 5 MG/ML
10 INJECTION INTRAMUSCULAR; INTRAVENOUS ONCE
Status: COMPLETED | OUTPATIENT
Start: 2024-08-14 | End: 2024-08-14

## 2024-08-14 RX ORDER — SODIUM CHLORIDE 0.9 % (FLUSH) 0.9 %
10 SYRINGE (ML) INJECTION AS NEEDED
Status: DISCONTINUED | OUTPATIENT
Start: 2024-08-14 | End: 2024-08-14 | Stop reason: HOSPADM

## 2024-08-14 RX ORDER — KETOROLAC TROMETHAMINE 15 MG/ML
15 INJECTION, SOLUTION INTRAMUSCULAR; INTRAVENOUS ONCE
Status: COMPLETED | OUTPATIENT
Start: 2024-08-14 | End: 2024-08-14

## 2024-08-14 RX ORDER — DIPHENHYDRAMINE HYDROCHLORIDE 50 MG/ML
25 INJECTION INTRAMUSCULAR; INTRAVENOUS ONCE
Status: COMPLETED | OUTPATIENT
Start: 2024-08-14 | End: 2024-08-14

## 2024-08-14 RX ADMIN — METOCLOPRAMIDE 10 MG: 5 INJECTION, SOLUTION INTRAMUSCULAR; INTRAVENOUS at 17:18

## 2024-08-14 RX ADMIN — DIPHENHYDRAMINE HYDROCHLORIDE 25 MG: 50 INJECTION, SOLUTION INTRAMUSCULAR; INTRAVENOUS at 17:16

## 2024-08-14 RX ADMIN — KETOROLAC TROMETHAMINE 15 MG: 15 INJECTION, SOLUTION INTRAMUSCULAR; INTRAVENOUS at 17:17

## 2024-08-14 RX ADMIN — SODIUM CHLORIDE 1000 ML: 9 INJECTION, SOLUTION INTRAVENOUS at 17:13

## 2024-08-14 NOTE — Clinical Note
[facility] EMERGENCY DEPARTMENT  [address] [place] [address]  Phone: [phone]    Emelyn Lopez was seen and treated in our emergency department on 8/14/2024.  She may return to work on 08/15/2024.         Thank you for choosing [facility].    Ami Rockwell RN

## 2024-08-14 NOTE — ED PROVIDER NOTES
EMERGENCY DEPARTMENT ENCOUNTER    Room Number:  S02/02  Date of encounter:  8/14/2024  PCP: Romi Graves APRN  Historian: Patient, family  Chronic or social conditions impacting care (social determinants of health): None    HPI:  Chief Complaint: Headache  A complete HPI/ROS/PMH/PSH/SH/FH are unobtainable due to: Nothing    Context: Emelyn Lopez is a 49 y.o. female with a history of migraine headaches, depression, who presents to the ED c/o acute headache starting over the past 2 days.  Patient reports a history of almost daily headaches.  She has had similar headaches in the past and follows with [facility] neurology.  She typically takes Maxalt and Topamax at home.  Her headache today did not improve with Topamax.  She reports her headache is a global headache which is similar to her previous headaches.  She has had nausea and photophobia.  She denies any fevers or neck pain.  She was initially seen at [facility] and sent here for further evaluation.    Review of prior external notes (non-ED):   I reviewed neurology office visit from 7/9/2024.  Patient being followed for recurrent headaches.    Review of prior external test results outside of this encounter:  I reviewed a CMP from 4/8/2024.  Creatinine 0.92, potassium 4.0    PAST MEDICAL HISTORY  Active Ambulatory Problems     Diagnosis Date Noted    No Active Ambulatory Problems     Resolved Ambulatory Problems     Diagnosis Date Noted    No Resolved Ambulatory Problems     Past Medical History:   Diagnosis Date    Anxiety     Cluster headache     Depression     Migraine     Seasonal allergies     Vertigo          PAST SURGICAL HISTORY  Past Surgical History:   Procedure Laterality Date    EAR TUBES      MASTOIDECTOMY Left          FAMILY HISTORY  Family History   Problem Relation Age of Onset    Supraventricular tachycardia Mother     Multiple myeloma Father          SOCIAL HISTORY  Social History     Socioeconomic History    Marital status:  Unknown   Tobacco Use    Smoking status: Every Day     Current packs/day: 0.75     Types: Cigarettes     Passive exposure: Current    Smokeless tobacco: Never   Vaping Use    Vaping status: Never Used   Substance and Sexual Activity    Alcohol use: No    Drug use: Defer    Sexual activity: Defer         ALLERGIES  Alatrofloxacin, Latex, and Sulfa antibiotics        REVIEW OF SYSTEMS  All systems reviewed and negative except for those discussed in HPI.       PHYSICAL EXAM    I have reviewed the triage vital signs and nursing notes.    ED Triage Vitals   Temp Heart Rate Resp BP SpO2   08/14/24 1649 08/14/24 1649 08/14/24 1649 08/14/24 1707 08/14/24 1649   98.3 °F (36.8 °C) (!) 124 16 131/72 97 %      Temp src Heart Rate Source Patient Position BP Location FiO2 (%)   08/14/24 1649 08/14/24 1649 08/14/24 1707 08/14/24 1707 --   Tympanic Monitor Lying Left arm        Physical Exam  GENERAL: Alert, oriented, not distressed  HENT: head atraumatic, no nuchal rigidity  EYES: no scleral icterus, EOMI  CV: regular rhythm, regular rate, no murmur  RESPIRATORY: normal effort, CTA  ABDOMEN: soft, nontender  MUSCULOSKELETAL: no deformity, FROM, no calf swelling or tenderness  NEURO: alert, normal cerebellar function, normal speech  SKIN: warm, dry        LAB RESULTS  No results found for this or any previous visit (from the past 24 hour(s)).    Ordered the above labs and independently reviewed the results.        RADIOLOGY  No Radiology Exams Resulted Within Past 24 Hours    I ordered the above noted radiological studies. Reviewed by me and discussed with radiologist.  See dictation for official radiology interpretation.      MEDICATIONS GIVEN IN ER    Medications   sodium chloride 0.9 % flush 10 mL (has no administration in time range)   sodium chloride 0.9 % bolus 1,000 mL (0 mL Intravenous Stopped 8/14/24 1809)   metoclopramide (REGLAN) injection 10 mg (10 mg Intravenous Given 8/14/24 1718)   diphenhydrAMINE (BENADRYL)  injection 25 mg (25 mg Intravenous Given 8/14/24 1716)   ketorolac (TORADOL) injection 15 mg (15 mg Intravenous Given 8/14/24 1717)         ADDITIONAL ORDERS CONSIDERED BUT NOT ORDERED:  Admission was considered but after careful review of the patient's presentation, physical examination, diagnostic results, and response to treatment the patient may be safely discharged with outpatient follow-up.       PROGRESS, DATA ANALYSIS, CONSULTS, AND MEDICAL DECISION MAKING    All labs have been independently interpreted by myself.  All radiology studies have been independently interpreted by myself and discussed with radiologist dictating the report.   EKG's independently interpreted by myself.  Discussion below represents my analysis of pertinent findings related to patient's condition, differential diagnosis, treatment plan and final disposition.    I have discussed case with Dr. Pope, emergency room physician.  He has performed his own bedside examination and agrees with treatment plan.    ED Course as of 08/14/24 1856   Wed Aug 14, 2024   1711 Patient complains of acute on chronic diffuse global headache.  Patient reports having similar headaches in the past with the same symptoms.  She denies any new symptoms.  Fairly benign exam without neurodeficits.  Will treat with migraine cocktail and reevaluate. [EE]   1844 Recheck patient.  She states she feels much better.  No neurodeficits.  She does have appropriate neuro follow-up.  We will discharge. [EE]      ED Course User Index  [EE] Thiago Garcia PA       AS OF 18:56 EDT VITALS:    BP - 104/54  HR - 56  TEMP - 98.3 °F (36.8 °C) (Tympanic)  O2 SATS - 97%        DIAGNOSIS  Final diagnoses:   Acute nonintractable headache, unspecified headache type         DISPOSITION  Discharged    Admission was considered but after careful review of the patient's presentation, physical examination, diagnostic results, and response to treatment the patient may be safely discharged  with outpatient follow-up.         Dictated utilizing Lissy dictation     Thiago Garcia PA  08/14/24 6638

## 2024-08-14 NOTE — ED PROVIDER NOTES
EMERGENCY DEPARTMENT MD ATTESTATION NOTE    Room Number:  S02/02  PCP: Romi Graves APRN  Independent Historians: Patient and Family    HPI:  A complete HPI/ROS/PMH/PSH/SH/FH are unobtainable due to: None    Chronic or social conditions impacting patient care (Social Determinants of Health): None      Context: Emelyn Lopez is a 49 y.o. female with a medical history of depression, anxiety, migraine headaches, vertigo who presents to the ED c/o acute migraine headache.  The patient reports she has had a migraine headache for the last 2 days.  She states it is typical of her prior migraines.  She denies any fevers or chills.  She denies any trauma.  She states she has taken her home medications without improvement.  She states she feels she needs to follow-up with her neurologist to adjust her medications because they do not seem to be helping as well as previous.        Review of prior external notes (non-ED) -and- Review of prior external test results outside of this encounter:  Laboratory evaluation 4/8/2024 shows normal CMP, normal CBC, normal INR    Prescription drug monitoring program review:           PHYSICAL EXAM    I have reviewed the triage vital signs and nursing notes.    ED Triage Vitals   Temp Heart Rate Resp BP SpO2   08/14/24 1649 08/14/24 1649 08/14/24 1649 08/14/24 1707 08/14/24 1649   98.3 °F (36.8 °C) (!) 124 16 131/72 97 %      Temp src Heart Rate Source Patient Position BP Location FiO2 (%)   08/14/24 1649 08/14/24 1649 08/14/24 1707 08/14/24 1707 --   Tympanic Monitor Lying Left arm        Physical Exam  GENERAL: Awake, alert, no acute distress  SKIN: Warm, dry  HENT: Normocephalic, atraumatic  EYES: no scleral icterus  CV: regular rhythm, regular rate  RESPIRATORY: normal effort, lungs clear  ABDOMEN: soft, nontender, nondistended  MUSCULOSKELETAL: no deformity  NEURO: alert, moves all extremities, follows commands            MEDICATIONS GIVEN IN ER  Medications   sodium chloride  0.9 % flush 10 mL (has no administration in time range)   sodium chloride 0.9 % bolus 1,000 mL (1,000 mL Intravenous New Bag 8/14/24 1713)   metoclopramide (REGLAN) injection 10 mg (10 mg Intravenous Given 8/14/24 1718)   diphenhydrAMINE (BENADRYL) injection 25 mg (25 mg Intravenous Given 8/14/24 1716)   ketorolac (TORADOL) injection 15 mg (15 mg Intravenous Given 8/14/24 1717)         ORDERS PLACED DURING THIS VISIT:  Orders Placed This Encounter   Procedures    Insert Peripheral IV         PROCEDURES  Procedures            PROGRESS, DATA ANALYSIS, CONSULTS, AND MEDICAL DECISION MAKING  All labs have been independently interpreted by me.  All radiology studies have been reviewed by me. All EKG's have been independently viewed and interpreted by me.  Discussion below represents my analysis of pertinent findings related to patient's condition, differential diagnosis, treatment plan and final disposition.    Differential diagnosis includes but is not limited to migraine headache, intracranial hemorrhage, intracranial mass, intracranial infection, chronic migraine.    Clinical Scores:                   ED Course as of 08/14/24 1846   Wed Aug 14, 2024   1711 Patient complains of acute on chronic diffuse global headache.  Patient reports having similar headaches in the past with the same symptoms.  She denies any new symptoms.  Fairly benign exam without neurodeficits.  Will treat with migraine cocktail and reevaluate. [EE]   1844 Recheck patient.  She states she feels much better.  No neurodeficits.  She does have appropriate neuro follow-up.  We will discharge. [EE]      ED Course User Index  [EE] Thiago Garcia PA       MDM: The patient's headache is typical of her prior migraines.  Plan to give her a migraine cocktail and reevaluate.  If her pain improves then plan discharge home.  She has no symptoms that are concerning for a change in character of her prior headache.      COMPLEXITY OF CARE  Admission was  considered but after careful review of the patient's presentation, physical examination, diagnostic results, and response to treatment the patient may be safely discharged with outpatient follow-up.    Please note that portions of this document were completed with a voice recognition program.    Note Disclaimer: At [facility], we believe that sharing information builds trust and better relationships. You are receiving this note because you recently visited [facility]. It is possible you will see health information before a provider has talked with you about it. This kind of information can be easy to misunderstand. To help you fully understand what it means for your health, we urge you to discuss this note with your provider.         Daniel Pope MD  08/14/24 7079

## 2024-08-15 ENCOUNTER — TELEPHONE (OUTPATIENT)
Dept: NEUROLOGY | Facility: CLINIC | Age: 49
End: 2024-08-15

## 2024-08-15 NOTE — TELEPHONE ENCOUNTER
Caller: Emelyn Lopez    Relationship: Self    Best call back number: [phone]    Which medication are you concerned about:   rizatriptan MLT (MAXALT-MLT) 10 MG disintegrating tablet     Who prescribed you this medication:   ARON HUANG    When did you start taking this medication:   SEVERAL WEEKS    What are your concerns:   PT SAID THIS RX ISN'T WORKING WELL. IT'S DOING LIKE SUMATRIPTAN. IT TAKES THE EDGE OFF THE MIGRAINES BUT HER NECK AND HEAD MUSCLES TENSE UP CAUSING ANOTHER MIGRAINE.    PT IS ASKING IF SHE CAN BE GIVEN UBRELVY. IF SO, [facility] [place].    PLEASE LET PT KNOW WHAT ARON SAL DECIDES.

## 2024-08-26 ENCOUNTER — HOSPITAL ENCOUNTER (OUTPATIENT)
Dept: PHYSICAL THERAPY | Facility: HOSPITAL | Age: 49
Setting detail: THERAPIES SERIES
Discharge: HOME | End: 2024-08-26
Payer: COMMERCIAL

## 2024-08-26 DIAGNOSIS — G44.86 CERVICOGENIC HEADACHE: Primary | ICD-10-CM

## 2024-08-26 DIAGNOSIS — M54.81 OCCIPITAL NEURALGIA OF LEFT SIDE: ICD-10-CM

## 2024-08-26 PROCEDURE — 97140 MANUAL THERAPY 1/> REGIONS: CPT

## 2024-08-26 PROCEDURE — 97530 THERAPEUTIC ACTIVITIES: CPT

## 2024-08-26 NOTE — THERAPY PROGRESS REPORT/RE-CERT
Outpatient Physical Therapy Ortho Progress Note  [facility]     Patient Name: Emelyn Lopez  : 1975  MRN: [id number]  Today's Date: 2024      Visit Date: 2024    Visit Dx:    ICD-10-CM ICD-9-CM   1. Cervicogenic headache  G44.86 784.0   2. Occipital neuralgia of left side  M54.81 723.8       There is no problem list on file for this patient.       Past Medical History:   Diagnosis Date    Anxiety     Cluster headache     Depression     Migraine     Seasonal allergies     Vertigo         Past Surgical History:   Procedure Laterality Date    EAR TUBES      MASTOIDECTOMY Left         PT Ortho       Row Name 24 1700       Cervical/Shoulder ROM Screen    Cervical rotation --  65 L, 80 R  -MO      Row Name 24 1600       Head/Neck/Trunk    Neck Lt Lateral Flexion AROM 25 w/ pain  -MO    Neck Rt Lateral Flexion AROM 35  -MO    Neck Lt Rotation AROM 65  -MO    Neck Rt Rotation AROM 80  -MO       MMT Left Upper Ext    Lt Shoulder Flexion MMT, Gross Movement (4+/5) good plus  -MO    Lt Shoulder ABduction MMT, Gross Movement (4+/5) good plus  -MO    Lt Upper Extremity Comments  both w/ pain  -MO              User Key  (r) = Recorded By, (t) = Taken By, (c) = Cosigned By      Initials Name Provider Type    Jacque Valdovinos, PT Physical Therapist                                 PT Assessment/Plan       Row Name 24 1700          PT Assessment    Functional Limitations Limitation in home management;Limitations in community activities;Performance in leisure activities;Performance in self-care ADL;Performance in work activities  -MO     Impairments Sensation;Range of motion;Posture;Pain;Muscle strength;Joint mobility;Joint integrity  -MO     Assessment Comments Emelyn Lopez has been seen for 8 physical therapy sessions for cervicogenic headaches.  Treatment has included therapeutic exercise, manual therapy, therapeutic activity, and patient education with home exercise program . Progress to  physical therapy goals is fair to good. Pt has met 3/3 STG and 1/4 LTG. Progress to goals limited secondary to significant additional health concerns including overall worsening migraines that are unresolved with medication however following her 2nd ER visit, her medication has been changed with some noted relief. As well as ongoing ear infections with increased dizziness, again, on new medication in the last several days with some mild resolve. All impacting overall ability to complete HEP at appropriate frequency. However, she does demonstrate improved cervical ROM in all directions, most notably with cervical rotation, now >60deg bilaterally, as well as improved R shoulder strength now 4+/5 however does have onset of pain in UT. She continues to be significantly tight upper traps, SCM, and suboccipitals, impacting ADL completion, ability to sit at computer for full work day without significant discomfort, and participate in recreational activities. Spent additional time this date discussing current POC, potential f/u with ENT and neurologist, and reviewed HEP completion. She will benefit from continued skilled physical therapy to address remaining impairments and functional limitations.  -MO     Please refer to paper survey for additional self-reported information No  -MO     Rehab Potential Good  -MO     Patient/caregiver participated in establishment of treatment plan and goals Yes  -MO     Patient would benefit from skilled therapy intervention Yes  -MO        PT Plan    PT Frequency 2x/week;1x/week  -MO     Predicted Duration of Therapy Intervention (PT) 7-8 visits  -MO     Planned CPT's? PT RE-EVAL: 29677;PT THER PROC EA 15 MIN: 31825;PT THER ACT EA 15 MIN: 78231;PT MANUAL THERAPY EA 15 MIN: 63224;PT NEUROMUSC RE-EDUCATION EA 15 MIN: 99490;PT GAIT TRAINING EA 15 MIN: 37463;PT SELF CARE/HOME MGMT/TRAIN EA 15: 53908;PT EVAL AQUA: 98119;PT AQUATIC THERAPY EA 15 MIN: 29011  -MO     PT Plan Comments resume  strengthening, update HEP. Trial standing to help with dizziness symptoms  -MO               User Key  (r) = Recorded By, (t) = Taken By, (c) = Cosigned By      Initials Name Provider Type    Jacque Valdovinos, PT Physical Therapist                       OP Exercises       Row Name 08/26/24 1600             Subjective    Subjective Comments They switched my migrane medication which has so far been helping I think. I got on new ear drop medication, have been on for a couple days so that is helping. There has just been so much going on I don't know what is really flaring it up, it is just all connected  -MO         Total Minutes    90102 - PT Therapeutic Activity Minutes 30  -MO      41157 - PT Manual Therapy Minutes 15  -MO         Exercise 2    Exercise Name 2 seated SNAG  -MO      Cueing 2 Verbal;Demo;Tactile  -MO      Sets 2 1B  -MO      Reps 2 5  -MO      Time 2 10s  -MO         Exercise 4    Exercise Name 4 seated SCM stretch  -MO      Cueing 4 Verbal;Demo;Tactile  -MO      Sets 4 2B  -MO      Reps 4 20s  -MO         Exercise 6    Exercise Name 6 Progress note  -MO      Additional Comments MMT, cervical ROM, POC discussion  -MO                User Key  (r) = Recorded By, (t) = Taken By, (c) = Cosigned By      Initials Name Provider Type    Jacque Vladovinos, PT Physical Therapist                             Manual Rx (Last 36 Hours)       Manual Treatments       Row Name 08/26/24 1700 08/26/24 1600          Total Minutes    16474 - PT Manual Therapy Minutes -- 15  -MO        Manual Rx 1    Manual Rx 1 Location cspine  -MO --     Manual Rx 1 Type suboccipital release, gentle distraction, BL UT STM with sustianed holds  -MO --               User Key  (r) = Recorded By, (t) = Taken By, (c) = Cosigned By      Initials Name Provider Type    Jacque Valdovinos, PT Physical Therapist                     PT OP Goals       Row Name 08/26/24 1600          PT Short Term Goals    STG Date to Achieve 07/11/24  -MO     STG 1 Pt will  be independent and verbalized good understanding of initial HEP to improve ability to manage pain, as well as improve strength and ROM.  -MO     STG 1 Progress Met  -MO     STG 2 Pt to be educated in/verbalize understanding of the importance of posture/ergonomics in association with their condition to facilitate self management of their condition.  -MO     STG 2 Progress Met  -MO     STG 3 Pt. will improve bilateral cervical rotation to 45 deg to assist with driving safely  -MO     STG 3 Progress Met  -MO        Long Term Goals    LTG Date to Achieve 08/10/24  -MO     LTG 1 Pt will be independent and verbalized good understanding of advanced HEP to improve ability to manage pain, as well as improve strength and ROM.  -MO     LTG 1 Progress Ongoing  -MO     LTG 2 Patient will improve ability to sleep through the night with 0 sleep disturbances related to shoulder pain to improve overall sleep quality and quality of life.  -MO     LTG 2 Progress Ongoing  -MO     LTG 3 Pt will improve NDI score to at least 45% perceived disability to show overall improved quality of life.  -MO     LTG 3 Progress Ongoing  -MO     LTG 4 Pt will improve bilateral shoulder flexion strength to at least 4+/5.  -MO     LTG 4 Progress Met  -MO               User Key  (r) = Recorded By, (t) = Taken By, (c) = Cosigned By      Initials Name Provider Type    Jacque Valdovinos PT Physical Therapist                                   Time Calculation:   Start Time: 1630  Stop Time: 1715  Time Calculation (min): 45 min  Timed Charges  62065 - PT Manual Therapy Minutes: 15  72499 - PT Therapeutic Activity Minutes: 30  Total Minutes  Timed Charges Total Minutes: 45   Total Minutes: 45  Therapy Charges for Today       Code Description Service Date Service Provider Modifiers Qty    91263236777 HC PT THERAPEUTIC ACT EA 15 MIN 8/26/2024 Jacque Solorzano PT GP 2    03269278701 HC PT MANUAL THERAPY EA 15 MIN 8/26/2024 Jacque Solorzano PT GP 1                       Jacque Solorzano, PT  8/26/2024

## 2024-08-28 ENCOUNTER — HOSPITAL ENCOUNTER (OUTPATIENT)
Dept: PHYSICAL THERAPY | Facility: HOSPITAL | Age: 49
Setting detail: THERAPIES SERIES
End: 2024-08-28
Payer: COMMERCIAL

## 2024-09-04 ENCOUNTER — APPOINTMENT (OUTPATIENT)
Dept: PHYSICAL THERAPY | Facility: HOSPITAL | Age: 49
End: 2024-09-04
Payer: COMMERCIAL

## 2024-09-19 ENCOUNTER — HOSPITAL ENCOUNTER (OUTPATIENT)
Dept: PHYSICAL THERAPY | Facility: HOSPITAL | Age: 49
Setting detail: THERAPIES SERIES
Discharge: HOME | End: 2024-09-19
Payer: COMMERCIAL

## 2024-09-19 DIAGNOSIS — G44.86 CERVICOGENIC HEADACHE: Primary | ICD-10-CM

## 2024-09-19 DIAGNOSIS — M54.81 OCCIPITAL NEURALGIA OF LEFT SIDE: ICD-10-CM

## 2024-09-19 PROCEDURE — 97110 THERAPEUTIC EXERCISES: CPT

## 2024-09-19 PROCEDURE — 97140 MANUAL THERAPY 1/> REGIONS: CPT

## 2024-09-23 ENCOUNTER — APPOINTMENT (OUTPATIENT)
Dept: PHYSICAL THERAPY | Facility: HOSPITAL | Age: 49
End: 2024-09-23
Payer: COMMERCIAL

## 2024-10-01 ENCOUNTER — HOSPITAL ENCOUNTER (OUTPATIENT)
Dept: PHYSICAL THERAPY | Facility: HOSPITAL | Age: 49
Setting detail: THERAPIES SERIES
Discharge: HOME | End: 2024-10-01
Payer: COMMERCIAL

## 2024-10-01 DIAGNOSIS — G44.86 CERVICOGENIC HEADACHE: Primary | ICD-10-CM

## 2024-10-01 DIAGNOSIS — M54.81 OCCIPITAL NEURALGIA OF LEFT SIDE: ICD-10-CM

## 2024-10-01 PROCEDURE — 97530 THERAPEUTIC ACTIVITIES: CPT

## 2024-10-01 PROCEDURE — 97140 MANUAL THERAPY 1/> REGIONS: CPT

## 2024-10-01 PROCEDURE — 97110 THERAPEUTIC EXERCISES: CPT

## 2024-10-01 NOTE — THERAPY PROGRESS REPORT/RE-CERT
Outpatient Physical Therapy Ortho Progress Note  [facility]     Patient Name: Emelyn Lopez  : 1975  MRN: [id number]  Today's Date: 10/1/2024      Visit Date: 10/01/2024    Visit Dx:    ICD-10-CM ICD-9-CM   1. Cervicogenic headache  G44.86 784.0   2. Occipital neuralgia of left side  M54.81 723.8       Patient Active Problem List   Diagnosis    Acute bacterial otitis media, left    Atopic rhinitis    Auditory vertigo    Chronic suppurative otitis media    Depression    Nausea        Past Medical History:   Diagnosis Date    Anxiety     Cluster headache     Depression     Migraine     Seasonal allergies     Vertigo         Past Surgical History:   Procedure Laterality Date    EAR TUBES      MASTOIDECTOMY Left                         PT Assessment/Plan       Row Name 10/01/24 1700          PT Assessment    Functional Limitations Limitation in home management;Limitations in community activities;Performance in leisure activities;Performance in self-care ADL;Performance in work activities  -DR     Impairments Sensation;Range of motion;Posture;Pain;Muscle strength;Joint mobility;Joint integrity  -DR     Assessment Comments Emelyn Lopez has been seen for 10 physical therapy sessions for chronic migraines, cervicogenic headaches, and occipital neuralgia on L side.  Treatment has included therapeutic exercise, manual therapy, therapeutic activity, and patient education with home exercise program . Progress to physical therapy goals is good. Pt has met 3/3 STG and 2/7 LTG, with 3 new goals added to address continued and remaining limitations. Pt reports a 100% improvement of cervical mobility and a 75% improvement with pain when sitting at a desk for work, all since beginning therapy. By last two hours of her work day, her neck pain (points to R UT region) intensifies. Pt still suffers with headaches daily, taking tylenol and naproxen to assist with relieving both headaches and ear pain. Migraines have decreased  from several times per week to only 1 per week. NDI outcome measure improved from a 30/50 to a 19/50, where 0 is no perceived disability. Pt completed a test to identify source of dizziness, ruling out from inner ear and possibly coming from brain. Pt completes a MRI on 10/7/24 to continue ruling out areas. Ms. Lopez has a vestibular evaluation here at [facility] on 10/28/24 to address vertigo. She will benefit from continued skilled physical therapy to address remaining impairments and functional limitations.  -     Please refer to paper survey for additional self-reported information No  -DR     Rehab Potential Good  -DR     Patient/caregiver participated in establishment of treatment plan and goals Yes  -DR     Patient would benefit from skilled therapy intervention Yes  -DR        PT Plan    PT Frequency 1x/week;2x/week  -     Predicted Duration of Therapy Intervention (PT) 8-10 visits  -     Planned CPT's? PT RE-EVAL: 45360;PT THER PROC EA 15 MIN: 71602;PT THER ACT EA 15 MIN: 67464;PT MANUAL THERAPY EA 15 MIN: 33204;PT NEUROMUSC RE-EDUCATION EA 15 MIN: 02873;PT GAIT TRAINING EA 15 MIN: 38254;PT EVAL AQUA: 86365;PT SELF CARE/HOME MGMT/TRAIN EA 15: 11453;PT HOT OR COLD PACK TREAT MCARE  -     PT Plan Comments update HEP, continue manual PRN. consider D2 flex?  -               User Key  (r) = Recorded By, (t) = Taken By, (c) = Cosigned By      Initials Name Provider Type    Sachin Martinez, PT Physical Therapist                       OP Exercises       Row Name 10/01/24 1700 10/01/24 1600          Subjective    Subjective Comments -- I have an MRI for my brain on monday.  -        Total Minutes    65353 - PT Therapeutic Exercise Minutes -- 13  -     03371 - PT Therapeutic Activity Minutes -- 20  -     77209 - PT Manual Therapy Minutes --  - 15  -        Exercise 1    Exercise Name 1 -- UBE  -     Time 1 -- 4 min  -        Exercise 3    Exercise Name 3 -- seated UT/LS stretch  -     Cueing 3  -- Verbal  -DR     Reps 3 -- 2e  -DR     Time 3 -- 20s  -DR        Exercise 8    Exercise Name 8 -- standing HA  -DR     Cueing 8 -- Verbal  -DR     Sets 8 -- 2  -DR     Reps 8 -- 12  -DR     Time 8 -- GTB  -DR        Exercise 10    Exercise Name 10 -- wall slides + lift  -DR     Cueing 10 -- Verbal;Demo  -DR     Sets 10 -- 2  -DR     Reps 10 -- 10  -DR        Exercise 11    Exercise Name 11 -- TA-progress notes  -DR               User Key  (r) = Recorded By, (t) = Taken By, (c) = Cosigned By      Initials Name Provider Type    Sachin Martinez, PT Physical Therapist                             Manual Rx (Last 36 Hours)       Manual Treatments       Row Name 10/01/24 1700 10/01/24 1600          Total Minutes    01111 - PT Manual Therapy Minutes --  -DR 15  -DR        Manual Rx 1    Manual Rx 1 Location cspine  -DR --     Manual Rx 1 Type suboccipital release, gentle distraction, BL UT STM with sustianed holds  -DR --               User Key  (r) = Recorded By, (t) = Taken By, (c) = Cosigned By      Initials Name Provider Type    Sachin Martinez, PT Physical Therapist                     PT OP Goals       Row Name 10/01/24 1600          PT Short Term Goals    STG Date to Achieve 07/11/24  -     STG 1 Pt will be independent and verbalized good understanding of initial HEP to improve ability to manage pain, as well as improve strength and ROM.  -     STG 1 Progress Met  -     STG 2 Pt to be educated in/verbalize understanding of the importance of posture/ergonomics in association with their condition to facilitate self management of their condition.  -     STG 2 Progress Met  -DR     STG 3 Pt. will improve bilateral cervical rotation to 45 deg to assist with driving safely  -     STG 3 Progress Met  -DR        Long Term Goals    LTG Date to Achieve 08/10/24  -DR     LTG 1 Pt will be independent and verbalized good understanding of advanced HEP to improve ability to manage pain, as well as improve  strength and ROM.  -DR     LTG 1 Progress Ongoing  -DR     LTG 2 Patient will improve ability to sleep through the night with 0 sleep disturbances related to shoulder pain to improve overall sleep quality and quality of life.  -DR     LTG 2 Progress Met  -DR     LTG 3 Pt will improve NDI score to at least 45% perceived disability to show overall improved quality of life.  -DR     LTG 3 Progress Met  -DR     LTG 4 Pt will improve bilateral shoulder flexion strength to at least 4+/5.  -DR     LTG 4 Progress Met  -DR     LTG 5 Pt will report 0 migraines in 4 weeks to assist with improving quality of life.  -DR     LTG 5 Progress New  -DR     LTG 6 Pt will report a 75% reduction in headaches per week to assist with improvement in quality of life.  -DR     LTG 6 Progress New  -DR     LTG 7 Pt will improve NDI score to at least 20% perceived disability to show overall improved quality of life.  -DR     LTG 7 Progress New  -DR               User Key  (r) = Recorded By, (t) = Taken By, (c) = Cosigned By      Initials Name Provider Type    Sachin Martinez, PT Physical Therapist                    Therapy Education  Given: HEP  Program: Reinforced  How Provided: Verbal, Demonstration  Provided to: Patient  Level of Understanding: Teach back education performed, Verbalized, Demonstrated       Neck Disability Index  Section 1 - Pain Intensity: The pain is mild at the moment.  Section 2 - Personal Care: It is painful to look after myself, and I am slow and careful.  Section 3 - Lifting: I can lift heavy weights, but it causes extra pain.  Section 4 - Reading: I can read as much as I want with moderate neck pain.  Section 5 - Headaches: I have moderate headaches that come frequently  Section 6 - Concentration: I have a great deal of difficulty in concentrating when I want to.  Section 7 - Work: I can do as much work as I want.  Section 8 - Driving: I can drive as long as I want with moderate neck pain.  Section 9 - Sleeping:  My sleep is mildly disturbed (1-2 hours sleepless).  Section 10 - Recreation: I am able to engage in most but not all recreational activities because of pain in my neck.  Neck Disability Index Score: 19  Neck Disability Index Comments: 19/50=38%      Time Calculation:   Start Time: 1628  Stop Time: 1716  Time Calculation (min): 48 min  Timed Charges  23017 - PT Therapeutic Exercise Minutes: 13  66353 - PT Manual Therapy Minutes: 15  81361 - PT Therapeutic Activity Minutes: 20  Total Minutes  Timed Charges Total Minutes: 48   Total Minutes: 48  Therapy Charges for Today       Code Description Service Date Service Provider Modifiers Qty    48561774468 HC PT THER PROC EA 15 MIN 10/1/2024 Sachin Hickey, PT GP 1    82704743358 HC PT THERAPEUTIC ACT EA 15 MIN 10/1/2024 Sachin Hickey, PT GP 1    04502972985 HC PT MANUAL THERAPY EA 15 MIN 10/1/2024 Sachin Hickey, PT GP 1            PT G-Codes  Neck Disability Index Score: 19         Sachin Hickey PT  10/1/2024

## 2024-10-07 ENCOUNTER — HOSPITAL ENCOUNTER (OUTPATIENT)
Dept: PHYSICAL THERAPY | Facility: HOSPITAL | Age: 49
Setting detail: THERAPIES SERIES
Discharge: HOME | End: 2024-10-07
Payer: COMMERCIAL

## 2024-10-07 DIAGNOSIS — G44.86 CERVICOGENIC HEADACHE: Primary | ICD-10-CM

## 2024-10-07 DIAGNOSIS — M54.81 OCCIPITAL NEURALGIA OF LEFT SIDE: ICD-10-CM

## 2024-10-07 PROCEDURE — 97110 THERAPEUTIC EXERCISES: CPT

## 2024-10-07 PROCEDURE — 97140 MANUAL THERAPY 1/> REGIONS: CPT

## 2024-10-07 NOTE — THERAPY TREATMENT NOTE
Outpatient Physical Therapy Ortho Treatment Note  [facility]     Patient Name: Emelyn Lopez  : 1975  MRN: [id number]  Today's Date: 10/7/2024      Visit Date: 10/07/2024    Visit Dx:    ICD-10-CM ICD-9-CM   1. Cervicogenic headache  G44.86 784.0   2. Occipital neuralgia of left side  M54.81 723.8       Patient Active Problem List   Diagnosis    Acute bacterial otitis media, left    Atopic rhinitis    Auditory vertigo    Chronic suppurative otitis media    Depression    Nausea        Past Medical History:   Diagnosis Date    Anxiety     Cluster headache     Depression     Migraine     Seasonal allergies     Vertigo         Past Surgical History:   Procedure Laterality Date    EAR TUBES      MASTOIDECTOMY Left                         PT Assessment/Plan       Row Name 10/07/24 1700          PT Assessment    Assessment Comments MRI completed this afternoon of brain, awaiting results to assess possible causes of dizziness. Returns with mild light-headedness but able to tolerate visit. Focused on manual techniques to relieve tension and feeling of oncoming migraine, with good relief reported from patient. Added D2 flexion, cueing required to decrease UT compensation. Difficulty with scap retraction and depression. Will continue to address impairment in future visits. Pt remains appropriate for skilled PT.  -        PT Plan    PT Plan Comments update HEP; manual therapy; prone ITY  -               User Key  (r) = Recorded By, (t) = Taken By, (c) = Cosigned By      Initials Name Provider Type    Sachin Martinez, PT Physical Therapist                       OP Exercises       Row Name 10/07/24 1700 10/07/24 1600          Subjective    Subjective Comments -- I'm a little lightheaded  -        Subjective Pain    Subjective Pain Comment -- 8 min late.  -        Total Minutes    79119 - PT Therapeutic Exercise Minutes -- 13  -     37561 - PT Manual Therapy Minutes --  -DR 25  -DR        Exercise 1     Exercise Name 1 -- UBE  -DR     Time 1 -- 4 min  -DR        Exercise 4    Exercise Name 4 -- D2 flex  -DR     Cueing 4 -- Verbal;Demo  -DR     Sets 4 -- 1e  -DR     Reps 4 -- 12  -DR     Time 4 -- RTB  -DR        Exercise 5    Exercise Name 5 -- supine chin tuck  -DR     Cueing 5 -- Verbal  -DR     Sets 5 -- 1  -DR     Reps 5 -- 10  -DR     Time 5 -- 5s  -DR        Exercise 8    Exercise Name 8 -- standing HA  -DR     Cueing 8 -- Verbal  -DR     Sets 8 -- 2  -DR     Reps 8 -- 15  -DR     Time 8 -- GTB  -DR               User Key  (r) = Recorded By, (t) = Taken By, (c) = Cosigned By      Initials Name Provider Type    Sachin Martinez, PT Physical Therapist                             Manual Rx (Last 36 Hours)       Manual Treatments       Row Name 10/07/24 1700 10/07/24 1600          Total Minutes    33548 - PT Manual Therapy Minutes --  -DR 25  -DR        Manual Rx 1    Manual Rx 1 Location cspine  -DR --     Manual Rx 1 Type suboccipital release, gentle distraction, BL UT STM with sustianed holds  -DR --               User Key  (r) = Recorded By, (t) = Taken By, (c) = Cosigned By      Initials Name Provider Type    Sachin Martinez, PT Physical Therapist                     PT OP Goals       Row Name 10/07/24 1600          PT Short Term Goals    STG Date to Achieve 07/11/24  -DR     STG 1 Pt will be independent and verbalized good understanding of initial HEP to improve ability to manage pain, as well as improve strength and ROM.  -     STG 1 Progress Met  -DR     STG 2 Pt to be educated in/verbalize understanding of the importance of posture/ergonomics in association with their condition to facilitate self management of their condition.  -     STG 2 Progress Met  -DR     STG 3 Pt. will improve bilateral cervical rotation to 45 deg to assist with driving safely  -     STG 3 Progress Met  -DR        Long Term Goals    LTG Date to Achieve 08/10/24  -DR     LTG 1 Pt will be independent and verbalized good  understanding of advanced HEP to improve ability to manage pain, as well as improve strength and ROM.  -     LTG 1 Progress Ongoing  -     LTG 2 Patient will improve ability to sleep through the night with 0 sleep disturbances related to shoulder pain to improve overall sleep quality and quality of life.  -     LTG 2 Progress Met  -     LTG 3 Pt will improve NDI score to at least 45% perceived disability to show overall improved quality of life.  -     LTG 3 Progress Met  -     LTG 4 Pt will improve bilateral shoulder flexion strength to at least 4+/5.  -     LTG 4 Progress Met  -     LTG 5 Pt will report 0 migraines in 4 weeks to assist with improving quality of life.  -     LTG 5 Progress New  -DR     LTG 6 Pt will report a 75% reduction in headaches per week to assist with improvement in quality of life.  -     LTG 6 Progress New  -DR     LTG 7 Pt will improve NDI score to at least 20% perceived disability to show overall improved quality of life.  -     LTG 7 Progress New  -DR               User Key  (r) = Recorded By, (t) = Taken By, (c) = Cosigned By      Initials Name Provider Type    Sachin Martinez, PT Physical Therapist                    Therapy Education  Given: HEP  Program: Reinforced  How Provided: Verbal, Demonstration  Provided to: Patient  Level of Understanding: Teach back education performed, Verbalized, Demonstrated              Time Calculation:   Start Time: 1653  Stop Time: 1731  Time Calculation (min): 38 min  Timed Charges  14858 - PT Therapeutic Exercise Minutes: 13  62179 - PT Manual Therapy Minutes: 25  Total Minutes  Timed Charges Total Minutes: 38   Total Minutes: 38  Therapy Charges for Today       Code Description Service Date Service Provider Modifiers Qty    20355401289 HC PT THER PROC EA 15 MIN 10/7/2024 Sachin Hickey, PT GP 1    99532626521 HC PT MANUAL THERAPY EA 15 MIN 10/7/2024 Sachin Hickey, PT GP 2                      Sachin Hickey  PT  10/7/2024

## 2024-10-14 ENCOUNTER — APPOINTMENT (OUTPATIENT)
Dept: PHYSICAL THERAPY | Facility: HOSPITAL | Age: 49
End: 2024-10-14
Payer: COMMERCIAL

## 2024-10-14 ENCOUNTER — TELEPHONE (OUTPATIENT)
Dept: NEUROLOGY | Facility: CLINIC | Age: 49
End: 2024-10-14
Payer: COMMERCIAL

## 2024-10-14 NOTE — TELEPHONE ENCOUNTER
Provider: ARON WOODY    Caller: PATIENT    Relationship to Patient: SELF    Pharmacy: [facility] [id number]    Phone Number: [phone]    Reason for Call: STATES UBRELVY SEEMS TO BE WORKING PRETTY WELL, STILL HAVING ABOUT A MIGRAINE A WEEK. STATES SHE HAS ONLY HAD 1 OR 2 THAT SHE HAS HAD TO GO IN AND GET A TORADOL SHOT FOR. IT IS BETTER THAN OTHER MEDICATIONS SHE HAS TRIED BUT SHE ISN'T SURE IF THAT IS NORMAL OR IF SHE SHOULD BE DOING BETTER WITH IT.    STATES SHE HAS MISSED WORK BECAUSE OF HER MIGRAINES & NOW HER WORK IS WILLING TO DO FMLA. WANTED TO SEE IF SHE WOULD NEED TO BE SEEN FOR THAT.    PLEASE REVIEW & ADVISE, THANK YOU.

## 2024-10-15 NOTE — TELEPHONE ENCOUNTER
Yes if she can schedule an appointment and bring her paperwork. We can always increase her topiramate if needed but it sounds like her headaches are much better controlled.

## 2024-10-16 ENCOUNTER — APPOINTMENT (OUTPATIENT)
Dept: PHYSICAL THERAPY | Facility: HOSPITAL | Age: 49
End: 2024-10-16
Payer: COMMERCIAL

## 2024-10-17 ENCOUNTER — SPECIALTY PHARMACY (OUTPATIENT)
Dept: NEUROLOGY | Facility: CLINIC | Age: 49
End: 2024-10-17
Payer: COMMERCIAL

## 2024-10-17 ENCOUNTER — OFFICE VISIT (OUTPATIENT)
Dept: NEUROLOGY | Facility: CLINIC | Age: 49
End: 2024-10-17
Payer: COMMERCIAL

## 2024-10-17 VITALS
SYSTOLIC BLOOD PRESSURE: 128 MMHG | WEIGHT: 228 LBS | DIASTOLIC BLOOD PRESSURE: 78 MMHG | HEIGHT: 63 IN | HEART RATE: 80 BPM | BODY MASS INDEX: 40.4 KG/M2 | OXYGEN SATURATION: 98 %

## 2024-10-17 DIAGNOSIS — G43.E19 INTRACTABLE CHRONIC MIGRAINE WITH AURA AND WITHOUT STATUS MIGRAINOSUS: Primary | ICD-10-CM

## 2024-10-17 PROCEDURE — 1159F MED LIST DOCD IN RCRD: CPT | Performed by: NURSE PRACTITIONER

## 2024-10-17 PROCEDURE — 1160F RVW MEDS BY RX/DR IN RCRD: CPT | Performed by: NURSE PRACTITIONER

## 2024-10-17 PROCEDURE — 99214 OFFICE O/P EST MOD 30 MIN: CPT | Performed by: NURSE PRACTITIONER

## 2024-10-17 NOTE — PROGRESS NOTES
Subjective   Emelyn Lopez is a 49 y.o. female presenting for follow-up.  She has been taking topiramate 50 mg twice a day along with Ubrelvy 100 mg as needed.  This has improved her migraines quite a bit, however she does continue to have a migraine at least 1 to 2 days/week.  Normally the Ubrelvy does improve the headache quite a bit, however on occasion she continues to have headache for the majority of the day.    Migraine     Review of Systems   Eyes:  Positive for photophobia and visual disturbance.   Gastrointestinal:  Positive for nausea.   Neurological:  Positive for dizziness, light-headedness and headache. Negative for tremors, seizures, syncope, facial asymmetry, speech difficulty, weakness, numbness, memory problem and confusion.     I have reviewed and confirmed the accuracy of the patient's history: Chief complaint, HPI, ROS, Subjective, and Past Family Social History as entered by the MA/LPN/RN. Elizabeth Hanna MA 10/17/24      Objective     Physical Examination:  General Appearance:  Well developed, well nourished, well groomed, alert, and cooperative.  HEENT: Normocephalic.    Neck and Spine: Normal range of motion.  Normal alignment. No mass or tenderness. No bruits.  Cardiac: Regular rate and rhythm. No murmurs.  Peripheral Vasculature: Radial and pedal pulses are equal and symmetric.   Extremities: No edema or deformities. Normal joint ROM.  Skin: No rashes or birth marks.      Neurological examination:   Higher Integrative Function: Oriented to time, place, and person. Normal registration, recall attention span and concentration. Normal language including comprehension, spontaneous speech, repetition, reading, writing, naming and vocabulary. No neglect with normal visual-spatial function and construction. Normal fund of knowledge and higher integrative function.   CN II: Pupils are equal, round and reactive to light. Normal visual acuity and visual fields.   CN III IV VI: Extraocular movements are  full without nystagmus.   CN V: Normal facial sensation and strength of muscles of mastication.   CN VII: Facial movements are symmetric. No weakness.   CN VIII: Auditory acuity is normal.  CN IX & X: Symmetric palatal movement.   CN XI: Sternocleidomastoid and trapezius are normal. No weakness.   CN XII: The tongue is midline. No atrophy or fasciculations.  Motor: Normal muscle strength, bulk and tone in upper and lower extremities. No fasciculations, rigidity, spasticity, or abnormal movements.   Reflexes: 2+ in the upper and lower extremities. Plantar responses are flexor.   Sensation: Normal light touch, pinprick, vibration, temperature, and proprioception in the arms and legs.   Station and Gait: Normal gait and station.   Coordination: Finger to nose test shows no dysmetria. Rapid alternating movements are normal. Heel to shin is normal.           Assessment & Plan   Diagnoses and all orders for this visit:    1. Intractable chronic migraine with aura and without status migrainosus (Primary)    Other orders  -     Zavegepant HCl 10 MG/ACT solution; Administer 1 spray into the nostril(s) as directed by provider Daily As Needed (migraine).  Dispense: 6 each; Refill: 5    Overall the topiramate has improved her headaches quite a bit.  She sees improvement most of the time with Ubrelvy 100 mg, however I also sent a prescription for Zavzpret today to use as a backup to Ubrelvy or for more severe headaches.  Instructions were given and side effects were discussed.  She will call with any problems.  We can increase the topiramate dose if needed, but for now we will continue 50 mg twice a day.  She has a follow-up already scheduled for January and will keep that appointment, however if she is doing well at the time she can move the appointment out a few more months.    [facility] paperwork filled out as well today.    I spent 35 minutes caring for patient on todays date of service. This time includes time spent by me in the  following activities: obtaining and/or reviewing a separately obtained history, performing a medically appropriate examination and/or evaluation, counseling and educating the patient on diagnosis and treatment, ordering medications, tests, or procedures, referring and communicating with other health care professionals and documenting information in the medical record.

## 2024-10-24 ENCOUNTER — HOSPITAL ENCOUNTER (OUTPATIENT)
Dept: PHYSICAL THERAPY | Facility: HOSPITAL | Age: 49
Setting detail: THERAPIES SERIES
Discharge: HOME | End: 2024-10-24
Payer: COMMERCIAL

## 2024-10-24 DIAGNOSIS — H92.03 OTALGIA OF BOTH EARS: ICD-10-CM

## 2024-10-24 DIAGNOSIS — M54.81 OCCIPITAL NEURALGIA OF LEFT SIDE: ICD-10-CM

## 2024-10-24 DIAGNOSIS — G44.86 CERVICOGENIC HEADACHE: Primary | ICD-10-CM

## 2024-10-24 PROCEDURE — 97110 THERAPEUTIC EXERCISES: CPT

## 2024-10-24 RX ORDER — PREDNISONE 20 MG/1
20 TABLET ORAL DAILY
Qty: 5 TABLET | Refills: 0 | OUTPATIENT
Start: 2024-10-24

## 2024-10-24 NOTE — THERAPY TREATMENT NOTE
Outpatient Physical Therapy Ortho Treatment Note  [facility]     Patient Name: Emelyn Lopez  : 1975  MRN: [id number]  Today's Date: 10/24/2024      Visit Date: 10/24/2024    Visit Dx:    ICD-10-CM ICD-9-CM   1. Cervicogenic headache  G44.86 784.0   2. Occipital neuralgia of left side  M54.81 723.8       Patient Active Problem List   Diagnosis    Acute bacterial otitis media, left    Atopic rhinitis    Auditory vertigo    Chronic suppurative otitis media    Depression    Nausea    Intractable chronic migraine with aura and without status migrainosus        Past Medical History:   Diagnosis Date    Anxiety     Cluster headache     Depression     Migraine     Seasonal allergies     Vertigo         Past Surgical History:   Procedure Laterality Date    EAR TUBES      MASTOIDECTOMY Left                         PT Assessment/Plan       Row Name 10/24/24 1700          PT Assessment    Assessment Comments Ms. Lopez returns to PT with increased neck pain/stiffness secondary to various other medical reasons. Due to late arrival, spent today reviewing stretching with adding sidelying open books to continue improving flexibility in posterior shoulder girdle region. Reviewed doorway stretch, UT/LS stretches as well. Updated HEP. Pt does have upcoming vestibular evaluation to hopefully R/I vs R/O further diagnoses regarding neck pain and dizziness. Discussed further POC. Pt remains appropriate for skilled PT at this time.  -        PT Plan    PT Plan Comments vestibular evaluation.  -               User Key  (r) = Recorded By, (t) = Taken By, (c) = Cosigned By      Initials Name Provider Type    Sachin Martinez, PT Physical Therapist                       OP Exercises       Row Name 10/24/24 1700             Subjective Pain    Subjective Pain Comment 12 minutes late.  -DR         Total Minutes    84713 - PT Therapeutic Exercise Minutes 38  -DR         Exercise 1    Exercise Name 1 UBE  -      Time 1 4 min   -DR         Exercise 2    Exercise Name 2 pec doorway str  -DR      Cueing 2 Verbal;Demo  -DR      Reps 2 3  -DR      Time 2 20s  -DR         Exercise 3    Exercise Name 3 seated UT/LS stretch  -DR      Cueing 3 Verbal  -DR      Reps 3 2e  -DR      Time 3 20s  -DR         Exercise 7    Exercise Name 7 s/l open books  -DR      Cueing 7 Verbal;Demo  -DR      Reps 7 10e  -DR      Time 7 hand clasped behind head  -DR                User Key  (r) = Recorded By, (t) = Taken By, (c) = Cosigned By      Initials Name Provider Type    Sachin Martinez, PT Physical Therapist                                  PT OP Goals       Row Name 10/24/24 1700          PT Short Term Goals    STG Date to Achieve 07/11/24  -     STG 1 Pt will be independent and verbalized good understanding of initial HEP to improve ability to manage pain, as well as improve strength and ROM.  -     STG 1 Progress Met  -     STG 2 Pt to be educated in/verbalize understanding of the importance of posture/ergonomics in association with their condition to facilitate self management of their condition.  -     STG 2 Progress Met  -DR     STG 3 Pt. will improve bilateral cervical rotation to 45 deg to assist with driving safely  -     STG 3 Progress Met  -DR        Long Term Goals    LTG Date to Achieve 08/10/24  -     LTG 1 Pt will be independent and verbalized good understanding of advanced HEP to improve ability to manage pain, as well as improve strength and ROM.  -     LTG 1 Progress Ongoing  -     LTG 2 Patient will improve ability to sleep through the night with 0 sleep disturbances related to shoulder pain to improve overall sleep quality and quality of life.  -     LTG 2 Progress Met  -     LTG 3 Pt will improve NDI score to at least 45% perceived disability to show overall improved quality of life.  -     LTG 3 Progress Met  -     LTG 4 Pt will improve bilateral shoulder flexion strength to at least 4+/5.  -     LTG 4  Progress Met  -     LTG 5 Pt will report 0 migraines in 4 weeks to assist with improving quality of life.  -DR     LTG 5 Progress New  -DR     LTG 6 Pt will report a 75% reduction in headaches per week to assist with improvement in quality of life.  -     LTG 6 Progress New  -DR     LTG 7 Pt will improve NDI score to at least 20% perceived disability to show overall improved quality of life.  -     LTG 7 Progress New  -               User Key  (r) = Recorded By, (t) = Taken By, (c) = Cosigned By      Initials Name Provider Type    Sachin Martinez, PT Physical Therapist                    Therapy Education  Education Details: discussed ongoing POC  Given: Symptoms/condition management, Pain management  Program: Reinforced  How Provided: Verbal, Demonstration  Provided to: Patient  Level of Understanding: Teach back education performed, Verbalized, Demonstrated              Time Calculation:   Start Time: 1727  Stop Time: 1805  Time Calculation (min): 38 min  Timed Charges  86478 - PT Therapeutic Exercise Minutes: 38  Total Minutes  Timed Charges Total Minutes: 38   Total Minutes: 38  Therapy Charges for Today       Code Description Service Date Service Provider Modifiers Qty    08767407067 HC PT THER PROC EA 15 MIN 10/24/2024 Sachin Hickey, PT GP 3                      Sachin Hickey, PEMA  10/24/2024

## 2024-10-28 ENCOUNTER — HOSPITAL ENCOUNTER (OUTPATIENT)
Dept: PHYSICAL THERAPY | Facility: HOSPITAL | Age: 49
Setting detail: THERAPIES SERIES
Discharge: HOME | End: 2024-10-28
Payer: COMMERCIAL

## 2024-10-28 DIAGNOSIS — R42 DIZZINESS: Primary | ICD-10-CM

## 2024-10-28 DIAGNOSIS — R26.89 BALANCE PROBLEM: ICD-10-CM

## 2024-10-28 PROCEDURE — 97161 PT EVAL LOW COMPLEX 20 MIN: CPT

## 2024-10-28 NOTE — THERAPY EVALUATION
Outpatient Physical Therapy Vestibular Initial Evaluation  [facility]     Patient Name: Emelyn Lopez  : 1975  MRN: [id number]  Today's Date: 10/28/2024      Visit Date: 10/28/2024    Patient Active Problem List   Diagnosis    Acute bacterial otitis media, left    Atopic rhinitis    Auditory vertigo    Chronic suppurative otitis media    Depression    Nausea    Intractable chronic migraine with aura and without status migrainosus        Past Medical History:   Diagnosis Date    Anxiety     Cluster headache     Depression     Migraine     Seasonal allergies     Vertigo         Past Surgical History:   Procedure Laterality Date    EAR TUBES      MASTOIDECTOMY Left          Visit Dx:     ICD-10-CM ICD-9-CM   1. Dizziness  R42 780.4   2. Balance problem  R26.89 781.99        Patient History       Row Name 10/28/24 1800             History    Chief Complaint Dizziness;Difficulty with daily activities;Balance Problems  -MO      Brief Description of Current Complaint 50 y/o female well known to this clinic previously treated for cspine pain, now being seen for chronic, ongoing dizziness. Pt reports over the last 2 months she feels she has 2 types of dizziness. 1: traditional spinning. Spinning symptoms are typically onset 2-3 times per week, will onset with turning over in bed, sitting up from bed, any positional changes. Some days the spinning will start the moment she opens her eyes, usually will be associated with an ear infection or she will have a migraine that day. Currently she is having migraines ~1x per week now which has improved from 2-4x per weeks. 2nd type of dizziness that she reports has become more common over the last 6 months is feeling of lightheadedness. She will feel like she is going to pass out walking across the floor. The light headedness will be a near constant symptom all day if it does come on. On top of that will get the spinning randomly with walking or sometimes driving, will  notice she is all the sudden veering and walking into a wall or the world feels like it is shifting. She reports BP is normally near 120/80 however over the last several months, has been having dips in BP. PCP said they typically don't put on medication for low. Said to just monitor. Has gotten as low as 90s/40s. Hx in the last 2-3 years include L mastoidectomy with graft, BL  balloons placed, and tubes. She does not have a tube in either ear currently. There has been talk about doing a mastoidectomy on the R side as well. She does have frequent ear infections, even when not full infection, will nearly always have fluid in the ears. Has seen neurologist for migraines but not for dizziness, potential referral for dizziness in the future. Current overall dizziness symptoms are always worse with looking up and to the side, any trunk bending, nearly all position changes. Has to be very slow and controlled, tries to limit certain movements to help with symptoms. Has near falls frequently.  -MO      Patient/Caregiver Goals Relief from dizziness;Know what to do to help the symptoms  -MO      Patient seeing anyone else for problem(s)? --  PCP, ENT  -MO      What clinical tests have you had for this problem? CT scan  -MO         Pain     Pain Location --  N/A. vestibular treatment  -MO         Fall Risk Assessment    Any falls in the past year: No  however several near falls regularly  -MO      Does patient have a fear of falling Yes (comment)  -MO         Services    Prior Rehab/Home Health Experiences No  -MO         Daily Activities    Primary Language English  -MO      Are you able to read Yes  -MO      Are you able to write Yes  -MO      How does patient learn best? Listening;Reading;Demonstration  -MO      Does patient have problems with the following? None  -MO      Barriers to learning None  -MO      Pt Participated in POC and Goals Yes  -MO         Safety    Are you being hurt, hit, or frightened by anyone at home  or in your life? No  -MO      Are you being neglected by a caregiver No  -MO      Have you had any of the following issues with N/A  -MO                User Key  (r) = Recorded By, (t) = Taken By, (c) = Cosigned By      Initials Name Provider Type    Jacque Valdovinos, PT Physical Therapist                     Vestibular Eval       Row Name 10/28/24 1700             Occulomotor Exam Fixation Present    Occular ROM Normal  -MO      Spontaneous Nystagmus Absent  -MO      Gaze-induced Nystagmus Absent  -MO      Head Shaking Horizontal Absent  SP  -MO      Head Shaking Vertical Absent  SP  -MO      Smooth Pursuit Saccadic;Symptom Provoking  -MO      Saccades Intact  -MO      Convergence Normal  -MO         Vestibulo-Occular Reflex (VOR)    VOR Cancellation Corrective saccades  -MO         Positional Testing    Positional Testing With infrared goggles  -MO      Vertebrobasilar Artery Screen - Right Negative  -MO      Vertebrobasilar Artery Screen - Left Negative  -MO      Murray-Hallpike Right No nystagmus  -MO      Murray-Hallpike Left No nystagmus  -MO      Horizontal Roll Test Right No nystagmus  -MO      Horizontal Roll Test Left No nystagmus  -MO         High-level Balance    High-level Balance Other 110/120 modified CTSIB  -MO                User Key  (r) = Recorded By, (t) = Taken By, (c) = Cosigned By      Initials Name Provider Type    Jacque Valdovinos PT Physical Therapist                                Therapy Education  Education Details: Educated on PT role and POC; purpose of vestibular therapy including anatomy of vestibular system and 3 semi-circular canals. Discussed BPPV vs. Differential diagnoses. Discussed inner ear/vestibular system function in relation to balance and overall change in positions.  Given: Symptoms/condition management  Program: New  How Provided: Verbal  Provided to: Patient  Level of Understanding: Verbalized                       PT OP Goals       Row Name 10/28/24 1800          PT Short  Term Goals    STG Date to Achieve 11/27/24  -MO     STG 1 Pt. will be independent with initial HEP to improve self-management of condition.  -MO     STG 1 Progress New  -MO     STG 2 Pt. will tolerate initial VOR exercises without provocation to progress to dynamic challenges.  -MO     STG 2 Progress New  -MO        Long Term Goals    LTG Date to Achieve 12/27/24  -MO     LTG 1 Pt. will be independent with advanced HEP to improve long term management of condition and independence.  -MO     LTG 1 Progress New  -MO     LTG 2 Pt will score </= 30/100 on DHI (from 58/100 on initial evaluation) to indicate improved perception of disability.  -MO     LTG 2 Progress New  -MO     LTG 3 Pt. will be independent with static fixation to reduce symptoms with transitional movements.  -MO     LTG 3 Progress New  -MO     LTG 4 Pt. will tolerate dynamic VOR without symptom provocation to mimic daily activities.  -MO     LTG 4 Progress New  -MO     LTG 5 Pt. will report 50% improvement in condition to improve QOL.  -MO     LTG 5 Progress New  -MO        Time Calculation    PT Goal Re-Cert Due Date 01/26/25  -MO               User Key  (r) = Recorded By, (t) = Taken By, (c) = Cosigned By      Initials Name Provider Type    Jacque Valdovinos, PT Physical Therapist                     PT Assessment/Plan       Row Name 10/28/24 1800          PT Assessment    Functional Limitations Decreased safety during functional activities;Performance in leisure activities;Performance in self-care ADL;Performance in work activities;Limitation in home management;Limitations in community activities  -MO     Impairments Balance;Coordination  -MO     Assessment Comments Emelyn Lopez is a 49 y.o. female referred to physical therapy for vestibular evaluation for chronic dizziness. She presents with an evolving clinical presentation, along with significant hx of BL ear infections, chronic fluid build up in BL ears, L mastoidectomy, BL balloon placements,  chronicity of ongoing dizziness, and fear avoidant behaviors that may impact her progress in the plan of care. Pt presents today with symptom provocation with near all tests completed this date. She has symptom onset with head shaking horizontal and vertical fixation however no nystagmus present, does have saccadic movement as well as symptom onset with smooth pursuit, and has corrective saccades with VOR cancellation. She scores 110/120 on modified CTSIB, has significant sway in position IV. She is (-) for BPPV bilaterally in all canals. She subjectively scores 58/100 on DHI, >/= 15 is indicative of handicap. Her signs and symptoms are consistent with bilateral vestibular hypofunction. Pt will benefit from skilled PT to address the previous impairments and return to PLOF.  -MO     Please refer to paper survey for additional self-reported information No  -MO     Rehab Potential Good  -MO     Patient/caregiver participated in establishment of treatment plan and goals Yes  -MO     Patient would benefit from skilled therapy intervention Yes  -MO        PT Plan    PT Frequency 1x/week;2x/week  -MO     Predicted Duration of Therapy Intervention (PT) 8-10 visits  -MO     Planned CPT's? PT EVAL LOW COMPLEXITY: 89941;PT RE-EVAL: 55523;PT THER PROC EA 15 MIN: 84735;PT THER ACT EA 15 MIN: 16054;PT MANUAL THERAPY EA 15 MIN: 31628;PT NEUROMUSC RE-EDUCATION EA 15 MIN: 86132;PT GAIT TRAINING EA 15 MIN: 46462;PT SELF CARE/HOME MGMT/TRAIN EA 15: 45907;PT CANALITH REPOSITIONIN  -MO     PT Plan Comments initiate VOR- head shaking and nods, trunk bend habituation, tandem stance EC, static fixation  -MO               User Key  (r) = Recorded By, (t) = Taken By, (c) = Cosigned By      Initials Name Provider Type    Jacque Valdovinos, PT Physical Therapist                     Outcome Measure Options: Dizziness Handicap Inventory  Dizziness Handicap Inventory Score: 58         Time Calculation:   Start Time:   Stop Time:  1808  Time Calculation (min): 53 min  Untimed Charges  PT Eval/Re-eval Minutes: 53  Total Minutes  Untimed Charges Total Minutes: 53   Total Minutes: 53   Therapy Charges for Today       Code Description Service Date Service Provider Modifiers Qty    29638718841 HC PT EVAL LOW COMPLEXITY 4 10/28/2024 Jacque Solorzano, PT GP 1            PT G-Codes  Outcome Measure Options: Dizziness Handicap Inventory         Jacque Solorzano, PEMA  10/28/2024

## 2024-10-30 ENCOUNTER — HOSPITAL ENCOUNTER (OUTPATIENT)
Dept: PHYSICAL THERAPY | Facility: HOSPITAL | Age: 49
Setting detail: THERAPIES SERIES
Discharge: HOME | End: 2024-10-30
Payer: COMMERCIAL

## 2024-10-30 DIAGNOSIS — R26.89 BALANCE PROBLEM: ICD-10-CM

## 2024-10-30 DIAGNOSIS — R42 DIZZINESS: Primary | ICD-10-CM

## 2024-10-30 PROCEDURE — 97530 THERAPEUTIC ACTIVITIES: CPT

## 2024-10-30 NOTE — THERAPY TREATMENT NOTE
Outpatient Physical Therapy Ortho Treatment Note  [facility]     Patient Name: Emelyn Lopez  : 1975  MRN: [id number]  Today's Date: 10/30/2024      Visit Date: 10/30/2024    Visit Dx:    ICD-10-CM ICD-9-CM   1. Dizziness  R42 780.4   2. Balance problem  R26.89 781.99       Patient Active Problem List   Diagnosis    Acute bacterial otitis media, left    Atopic rhinitis    Auditory vertigo    Chronic suppurative otitis media    Depression    Nausea    Intractable chronic migraine with aura and without status migrainosus        Past Medical History:   Diagnosis Date    Anxiety     Cluster headache     Depression     Migraine     Seasonal allergies     Vertigo         Past Surgical History:   Procedure Laterality Date    EAR TUBES      MASTOIDECTOMY Left                         PT Assessment/Plan       Row Name 10/30/24 1700          PT Assessment    Assessment Comments Emelyn returns to therapy following initial vestibular eval for dizziness, denies any significant changes. She reports today on a good day, had minimal dizziness episodes throughout her workday. Initiated seated VOR, seated horizontal smooth pursuit, tandem walk with visual fixation, tandem stance with EC, and NBOS on airex with head turns. She does have onset of headache during session however dizziness is fairly minimal. Spent time discussing correlation between dizziness, increased cspine tightness, and heightened nervous system response. Initiated HEP, discussed appropriate frequency. Will assess tolerance at next session and continue to progress as appropriate.  -MO        PT Plan    PT Plan Comments trunk bending habituation, standing NBOS on airex with nods  -MO               User Key  (r) = Recorded By, (t) = Taken By, (c) = Cosigned By      Initials Name Provider Type    Jacque Valdovinos, PT Physical Therapist                       OP Exercises       Row Name 10/30/24 1600             Subjective    Subjective Comments Today has been  relatively calm, made it through work  -MO         Subjective Pain    Able to rate subjective pain? yes  -MO      Pre-Treatment Pain Level 0  -MO      Post-Treatment Pain Level 0  -MO         Total Minutes    37822 - PT Therapeutic Activity Minutes 38  -MO         Exercise 1    Exercise Name 1 VOR nods, shake  -MO      Sets 1 2e  -MO      Reps 1 30s  -MO      Additional Comments seated  -MO         Exercise 2    Exercise Name 2 seated horizontal smooth pursuit  -MO      Cueing 2 Verbal  -MO      Reps 2 2  -MO      Time 2 30s  -MO         Exercise 3    Exercise Name 3 tandem walk  -MO      Cueing 3 Verbal  -MO      Reps 3 4 laps  -MO      Time 3 fwd, bwd  -MO      Additional Comments visual fixation  -MO         Exercise 4    Exercise Name 4 tandem stance EC  -MO      Cueing 4 Verbal;Demo  -MO      Sets 4 2B  -MO      Reps 4 30s  -MO         Exercise 5    Exercise Name 5 NBOS on foam w/ head turns  -MO      Sets 5 2  -MO      Reps 5 30s  -MO      Additional Comments on airex  -MO                User Key  (r) = Recorded By, (t) = Taken By, (c) = Cosigned By      Initials Name Provider Type    Jacque Valdovinos, PT Physical Therapist                                                    Time Calculation:   Start Time: 1630  Stop Time: 1708  Time Calculation (min): 38 min  Timed Charges  97495 - PT Therapeutic Activity Minutes: 38  Total Minutes  Timed Charges Total Minutes: 38   Total Minutes: 38  Therapy Charges for Today       Code Description Service Date Service Provider Modifiers Qty    73818093147  PT THERAPEUTIC ACT EA 15 MIN 10/30/2024 Jacque Solorzano, PT GP 3                      Jacque Solorzano PT  10/30/2024

## 2024-11-04 ENCOUNTER — HOSPITAL ENCOUNTER (OUTPATIENT)
Dept: PHYSICAL THERAPY | Facility: HOSPITAL | Age: 49
Setting detail: THERAPIES SERIES
Discharge: HOME | End: 2024-11-04
Payer: COMMERCIAL

## 2024-11-04 DIAGNOSIS — R42 DIZZINESS: Primary | ICD-10-CM

## 2024-11-04 DIAGNOSIS — R26.89 BALANCE PROBLEM: ICD-10-CM

## 2024-11-04 PROCEDURE — 97530 THERAPEUTIC ACTIVITIES: CPT

## 2024-11-04 NOTE — THERAPY TREATMENT NOTE
Outpatient Physical Therapy Vestibular Treatment Note  [facility]     Patient Name: Emelyn Lopez  : 1975  MRN: [id number]  Today's Date: 2024      Visit Date: 2024    Visit Dx:     ICD-10-CM ICD-9-CM   1. Dizziness  R42 780.4   2. Balance problem  R26.89 781.99       Patient Active Problem List   Diagnosis    Acute bacterial otitis media, left    Atopic rhinitis    Auditory vertigo    Chronic suppurative otitis media    Depression    Nausea    Intractable chronic migraine with aura and without status migrainosus                        PT Assessment/Plan       Row Name 24 1800          PT Assessment    Assessment Comments Emelyn returns reporting intermittent compliance to HEP. She does have overall improved tolerance to exercises this date. Continued with current program with addition of nods to NBOS on airex as well as well as seated trunk bending with 2 point visual fixation. Cueing required to slow VOR exercises to improve cspine range. No onset of migraine this date. She does have onset of lightheadedness following habituation, BP checked at 120/83, will continue to monitor however likely vestibular response to repetitive position changes.  -MO        PT Plan    PT Plan Comments walking with head turns and nods, update HEP  -MO               User Key  (r) = Recorded By, (t) = Taken By, (c) = Cosigned By      Initials Name Provider Type    Jacque Valdovinos, PT Physical Therapist                        OP Exercises       Row Name 24 1600             Subjective    Subjective Comments Wasn't as good with my exercises but I did them some  -MO         Total Minutes    30805 - PT Therapeutic Activity Minutes 38  -MO         Exercise 1    Exercise Name 1 VOR nods, shake  -MO      Sets 1 2e, both positions  -MO      Reps 1 30s  -MO      Additional Comments seated and standing  -MO         Exercise 2    Exercise Name 2 seated horizontal smooth pursuit  -MO      Cueing 2 Verbal  -MO       Reps 2 2  -MO      Time 2 30s  -MO         Exercise 3    Exercise Name 3 tandem walk  -MO      Cueing 3 Verbal  -MO      Reps 3 4 laps  -MO      Time 3 fwd, bwd  -MO      Additional Comments visual fixation  -MO         Exercise 4    Exercise Name 4 tandem stance EC  -MO      Cueing 4 Verbal  -MO      Sets 4 2B  -MO      Reps 4 30s  -MO         Exercise 5    Exercise Name 5 NBOS on foam w/ head turns and nods  -MO      Sets 5 2e  -MO      Reps 5 30s  -MO      Time 5 airex  -MO         Exercise 6    Exercise Name 6 seated trunk bend w/ 2 point fixation  -MO      Cueing 6 Verbal;Demo  -MO      Sets 6 1  -MO      Reps 6 10  -MO      Time 6 maintained each position until symptoms resolved  -MO                User Key  (r) = Recorded By, (t) = Taken By, (c) = Cosigned By      Initials Name Provider Type    Jacque Valdovinos, PT Physical Therapist                                                   Time Calculation:   Start Time: 1630  Stop Time: 1708  Time Calculation (min): 38 min  Timed Charges  40673 - PT Therapeutic Activity Minutes: 38  Total Minutes  Timed Charges Total Minutes: 38   Total Minutes: 38   Therapy Charges for Today       Code Description Service Date Service Provider Modifiers Qty    [id number]  PT THERAPEUTIC ACT EA 15 MIN 11/4/2024 Jacque Solorzano, PT GP 3                     Jacque Solorzano PT  11/4/2024

## 2024-11-06 ENCOUNTER — HOSPITAL ENCOUNTER (OUTPATIENT)
Dept: PHYSICAL THERAPY | Facility: HOSPITAL | Age: 49
Setting detail: THERAPIES SERIES
Discharge: HOME | End: 2024-11-06
Payer: COMMERCIAL

## 2024-11-06 DIAGNOSIS — R42 DIZZINESS: Primary | ICD-10-CM

## 2024-11-06 DIAGNOSIS — R26.89 BALANCE PROBLEM: ICD-10-CM

## 2024-11-06 DIAGNOSIS — G44.86 CERVICOGENIC HEADACHE: ICD-10-CM

## 2024-11-06 PROCEDURE — 97140 MANUAL THERAPY 1/> REGIONS: CPT

## 2024-11-06 PROCEDURE — 97530 THERAPEUTIC ACTIVITIES: CPT

## 2024-11-06 NOTE — THERAPY TREATMENT NOTE
Outpatient Physical Therapy Vestibular Treatment Note  [facility]     Patient Name: Emelyn Lopez  : 1975  MRN: [id number]  Today's Date: 2024      Visit Date: 2024    Visit Dx:     ICD-10-CM ICD-9-CM   1. Dizziness  R42 780.4   2. Balance problem  R26.89 781.99   3. Cervicogenic headache  G44.86 784.0       Patient Active Problem List   Diagnosis    Acute bacterial otitis media, left    Atopic rhinitis    Auditory vertigo    Chronic suppurative otitis media    Depression    Nausea    Intractable chronic migraine with aura and without status migrainosus                        PT Assessment/Plan       Row Name 24 1700          PT Assessment    Assessment Comments Emelyn returns to PT this date reporting good relief following last session however had an onset of migraine last night that has lingered into today as well as the start of a R ear infection. Modified session to address significant tight suboccipitals and Uts, continuing to apply pressure and tension to base of occiput, likely aggravating dizziness symptoms. Additionally added lymp/drainage massage for ear control, pt reports significant relief with all symptoms at session termination. Secondary to migraine and ear pain, only completed seated exercises this date however she does demo much improved cspine range with exercises and has minimal onset of dizziness with all exercises. Continued to encourage daily exercise completion, 3x per day, as well as encouraged pt be seen by urgent care to address ear infection before symptoms worsen. Will assess tolerance at next session and continue to progress as appropriate.  -MO        PT Plan    PT Plan Comments how was ENT? walking with head turns/nods, update HEP  -MO               User Key  (r) = Recorded By, (t) = Taken By, (c) = Cosigned By      Initials Name Provider Type    Jacque Valdovinos, PT Physical Therapist                        OP Exercises       Row Name 24 1700              Subjective    Subjective Comments Everything was good until yesterday, my ear started hurting and I had a terrible migrane. I was waiting to see if my ear hurt from the headache or if it was an ear infection but my meds didn't change my ear pain so I need to go to the Dr. I had a good day yesterday until last night  -MO         Total Minutes    08871 - PT Therapeutic Activity Minutes 8  -MO      06950 - PT Manual Therapy Minutes 15  -MO         Exercise 1    Exercise Name 1 VOR nods, shake  -MO      Sets 1 2e, both positions  -MO      Reps 1 30s  -MO         Exercise 2    Exercise Name 2 seated horizontal smooth pursuit  -MO      Cueing 2 Verbal  -MO      Reps 2 2  -MO      Time 2 30s  -MO                User Key  (r) = Recorded By, (t) = Taken By, (c) = Cosigned By      Initials Name Provider Type    Jacque Valdovinos, PT Physical Therapist                  Manual Rx (Last 36 Hours)       Manual Treatments       Row Name 11/06/24 1700             Total Minutes    70236 - PT Manual Therapy Minutes 15  -MO         Manual Rx 2    Manual Rx 2 Location suboccipital release, B UT stretch, R UT stretch into sustained hold, facial lymp massage for drainage, temporal massage  -MO                User Key  (r) = Recorded By, (t) = Taken By, (c) = Cosigned By      Initials Name Provider Type    Jacque Valdovinos PT Physical Therapist                                                 Time Calculation:   Start Time: 1630  Stop Time: 1653  Time Calculation (min): 23 min  Timed Charges  51144 - PT Manual Therapy Minutes: 15  52336 - PT Therapeutic Activity Minutes: 8  Total Minutes  Timed Charges Total Minutes: 23   Total Minutes: 23   Therapy Charges for Today       Code Description Service Date Service Provider Modifiers Qty    41213931968 HC PT THERAPEUTIC ACT EA 15 MIN 11/6/2024 Jacque Solorzano, PT GP 1    27994158339 HC PT MANUAL THERAPY EA 15 MIN 11/6/2024 Jacque Solorzano, PT GP 1                     Jacque Solorzano  PT  11/6/2024

## 2024-11-11 ENCOUNTER — APPOINTMENT (OUTPATIENT)
Dept: PHYSICAL THERAPY | Facility: HOSPITAL | Age: 49
End: 2024-11-11
Payer: COMMERCIAL

## 2024-11-14 ENCOUNTER — APPOINTMENT (OUTPATIENT)
Dept: PHYSICAL THERAPY | Facility: HOSPITAL | Age: 49
End: 2024-11-14
Payer: COMMERCIAL

## 2024-11-18 ENCOUNTER — HOSPITAL ENCOUNTER (OUTPATIENT)
Dept: PHYSICAL THERAPY | Facility: HOSPITAL | Age: 49
Setting detail: THERAPIES SERIES
Discharge: HOME | End: 2024-11-18
Payer: COMMERCIAL

## 2024-11-18 DIAGNOSIS — R42 DIZZINESS: Primary | ICD-10-CM

## 2024-11-18 DIAGNOSIS — R26.89 BALANCE PROBLEM: ICD-10-CM

## 2024-11-18 PROCEDURE — 97530 THERAPEUTIC ACTIVITIES: CPT

## 2024-11-18 NOTE — THERAPY TREATMENT NOTE
Outpatient Physical Therapy Vestibular Treatment Note  [facility]     Patient Name: Emelyn Lopez  : 1975  MRN: [id number]  Today's Date: 2024      Visit Date: 2024    Visit Dx:     ICD-10-CM ICD-9-CM   1. Dizziness  R42 780.4   2. Balance problem  R26.89 781.99       Patient Active Problem List   Diagnosis    Acute bacterial otitis media, left    Atopic rhinitis    Auditory vertigo    Chronic suppurative otitis media    Depression    Nausea    Intractable chronic migraine with aura and without status migrainosus                        PT Assessment/Plan       Row Name 24 1700          PT Assessment    Assessment Comments Emelyn returns to PT this date following several days of sickness last week with increased dizziness however she returns this date feeling much better with no episodes of dizziness today. Significantly improved tolerance to exercises as well as improved ROM with VOR exercises, tolerating greater rotation without symptoms. She has 1 session remaining, will plan for full HEP update and discussion on continued progressions over the next several weeks.  -MO        PT Plan    PT Plan Comments d/c to HEP  -MO               User Key  (r) = Recorded By, (t) = Taken By, (c) = Cosigned By      Initials Name Provider Type    Jacque Valdovinos, PT Physical Therapist                        OP Exercises       Row Name 24 1600             Subjective    Subjective Comments Was sick all last week with that virus that minics Covid, had a fever and was so dizzy so cancelled my appointments. Feel so much better today. Honestly didn't do my exercises last week  because I felt so bad.  -MO         Total Minutes    59952 - PT Therapeutic Activity Minutes 38  -MO         Exercise 1    Exercise Name 1 VOR nods, shake  -MO      Sets 1 2e, both positions  -MO      Reps 1 30s  -MO         Exercise 2    Exercise Name 2 horizontal and vertical smooth pursuit  -MO      Cueing 2 Verbal  -MO       Reps 2 2e  -MO      Time 2 seated  -MO         Exercise 3    Exercise Name 3 tandem walk  -MO      Cueing 3 Verbal  -MO      Reps 3 3 laps  -MO      Time 3 fwd, bwd  -MO      Additional Comments w/ head turns  -MO         Exercise 4    Exercise Name 4 tandem stance EC  -MO      Cueing 4 Verbal  -MO      Sets 4 2B  -MO      Reps 4 30s  -MO         Exercise 5    Exercise Name 5 NBOS on airex VOR x1  -MO      Sets 5 2e  -MO      Reps 5 30s  -MO      Time 5 airex  -MO         Exercise 6    Exercise Name 6 seated trunk bending habituation  -MO      Cueing 6 Verbal  -MO      Sets 6 1  -MO      Reps 6 6  -MO      Time 6 maintained each position until symptoms resolved  -MO                User Key  (r) = Recorded By, (t) = Taken By, (c) = Cosigned By      Initials Name Provider Type    Jacque Valdovinos, PT Physical Therapist                                 PT OP Goals       Row Name 11/18/24 1600          PT Short Term Goals    STG Date to Achieve 11/27/24  -MO     STG 1 Pt. will be independent with initial HEP to improve self-management of condition.  -MO     STG 1 Progress Ongoing  -MO     STG 2 Pt. will tolerate initial VOR exercises without provocation to progress to dynamic challenges.  -MO     STG 2 Progress Ongoing  -MO        Long Term Goals    LTG Date to Achieve 12/27/24  -MO     LTG 1 Pt. will be independent with advanced HEP to improve long term management of condition and independence.  -MO     LTG 1 Progress Ongoing  -MO     LTG 2 Pt will score </= 30/100 on DHI (from 58/100 on initial evaluation) to indicate improved perception of disability.  -MO     LTG 2 Progress Ongoing  -MO     LTG 3 Pt. will be independent with static fixation to reduce symptoms with transitional movements.  -MO     LTG 3 Progress Ongoing  -MO     LTG 4 Pt. will tolerate dynamic VOR without symptom provocation to mimic daily activities.  -MO     LTG 4 Progress Ongoing  -MO     LTG 5 Pt. will report 50% improvement in condition to improve  QOL.  -MO     LTG 5 Progress Ongoing  -MO               User Key  (r) = Recorded By, (t) = Taken By, (c) = Cosigned By      Initials Name Provider Type    Jacque Valdovinos, PT Physical Therapist                                   Time Calculation:   Start Time: 1630  Stop Time: 1708  Time Calculation (min): 38 min  Timed Charges  46993 - PT Therapeutic Activity Minutes: 38  Total Minutes  Timed Charges Total Minutes: 38   Total Minutes: 38   Therapy Charges for Today       Code Description Service Date Service Provider Modifiers Qty    45431684077  PT THERAPEUTIC ACT EA 15 MIN 11/18/2024 Jacque Solorzano, PEMA GP 3                     Jacque Solorzano PT  11/18/2024

## 2024-11-20 ENCOUNTER — HOSPITAL ENCOUNTER (OUTPATIENT)
Dept: PHYSICAL THERAPY | Facility: HOSPITAL | Age: 49
Setting detail: THERAPIES SERIES
Discharge: HOME | End: 2024-11-20
Payer: COMMERCIAL

## 2024-11-20 DIAGNOSIS — R42 DIZZINESS: Primary | ICD-10-CM

## 2024-11-20 DIAGNOSIS — R26.89 BALANCE PROBLEM: ICD-10-CM

## 2024-11-20 DIAGNOSIS — G44.86 CERVICOGENIC HEADACHE: ICD-10-CM

## 2024-11-20 PROCEDURE — 97530 THERAPEUTIC ACTIVITIES: CPT

## 2024-11-20 NOTE — THERAPY DISCHARGE NOTE
Outpatient Physical Therapy Vestibular Treatment Note/Discharge Summary  [facility]     Patient Name: Emelyn Lopez  : 1975  MRN: [id number]  Today's Date: 2024      Visit Date: 2024    Visit Dx:     ICD-10-CM ICD-9-CM   1. Dizziness  R42 780.4   2. Balance problem  R26.89 781.99   3. Cervicogenic headache  G44.86 784.0       Patient Active Problem List   Diagnosis    Acute bacterial otitis media, left    Atopic rhinitis    Auditory vertigo    Chronic suppurative otitis media    Depression    Nausea    Intractable chronic migraine with aura and without status migrainosus                         PT Assessment/Plan       Row Name 24 1600          PT Assessment    Assessment Comments Ms. Dunaway returns to PT this date with ongoing viral sickness however denies any significant flare up of dizziness symptoms. Overall over the last several weeks, pt has been intermittently compliant to HEP however does state that she has felt an improvement in severity of vestibular symptoms and has improved tolerance to change in head positions. Spent time completing full HEP review with discussion on ways to progress and modify each exercise as needed. Discussed goal of daily VOR and balance exercises for overall maintenance of condition. Encouraged pt to f/u should symptoms return in severity or have any changes. At this time, she is d/c to IND management of condition.  -MO        PT Plan    PT Plan Comments d/c  -MO               User Key  (r) = Recorded By, (t) = Taken By, (c) = Cosigned By      Initials Name Provider Type    Jacque Valdovinos, PT Physical Therapist                          OP Exercises       Row Name 24 1600             Subjective    Subjective Comments Not all the way out of the sickness. A little dizzy but not too bad  -MO         Total Minutes    23974 - PT Therapeutic Activity Minutes 15  -MO         Exercise 7    Exercise Name 7 head arm opposite motion  -MO      Sets 7 1  -MO       Reps 7 30s  -MO      Time 7 standing  -MO                User Key  (r) = Recorded By, (t) = Taken By, (c) = Cosigned By      Initials Name Provider Type    Jacque Valdovinos PT Physical Therapist                                   PT OP Goals       Row Name 11/20/24 1600          PT Short Term Goals    STG Date to Achieve 11/27/24  -MO     STG 1 Pt. will be independent with initial HEP to improve self-management of condition.  -MO     STG 1 Progress Partially Met  -MO     STG 2 Pt. will tolerate initial VOR exercises without provocation to progress to dynamic challenges.  -MO     STG 2 Progress Met  -MO        Long Term Goals    LTG Date to Achieve 12/27/24  -MO     LTG 1 Pt. will be independent with advanced HEP to improve long term management of condition and independence.  -MO     LTG 1 Progress Ongoing  -MO     LTG 2 Pt will score </= 30/100 on DHI (from 58/100 on initial evaluation) to indicate improved perception of disability.  -MO     LTG 2 Progress Ongoing  -MO     LTG 3 Pt. will be independent with static fixation to reduce symptoms with transitional movements.  -MO     LTG 3 Progress Met  -MO     LTG 4 Pt. will tolerate dynamic VOR without symptom provocation to mimic daily activities.  -MO     LTG 4 Progress Ongoing  -MO     LTG 5 Pt. will report 50% improvement in condition to improve QOL.  -MO     LTG 5 Progress Ongoing  -MO               User Key  (r) = Recorded By, (t) = Taken By, (c) = Cosigned By      Initials Name Provider Type    Jacque Valdovinos PT Physical Therapist                                   Time Calculation:   Start Time: 1630  Stop Time: 1645  Time Calculation (min): 15 min  Timed Charges  75682 - PT Therapeutic Activity Minutes: 15  Total Minutes  Timed Charges Total Minutes: 15   Total Minutes: 15     Therapy Charges for Today       Code Description Service Date Service Provider Modifiers Qty    53081494586 HC PT THERAPEUTIC ACT EA 15 MIN 11/20/2024 Jacque Solorzano, PT GP 1                            Jacque Solorzano, PT  11/20/2024

## 2025-01-07 ENCOUNTER — TELEMEDICINE (OUTPATIENT)
Dept: NEUROLOGY | Facility: CLINIC | Age: 50
End: 2025-01-07
Payer: COMMERCIAL

## 2025-01-07 DIAGNOSIS — G43.E19 INTRACTABLE CHRONIC MIGRAINE WITH AURA AND WITHOUT STATUS MIGRAINOSUS: Primary | ICD-10-CM

## 2025-01-07 PROCEDURE — 1160F RVW MEDS BY RX/DR IN RCRD: CPT | Performed by: NURSE PRACTITIONER

## 2025-01-07 PROCEDURE — 1159F MED LIST DOCD IN RCRD: CPT | Performed by: NURSE PRACTITIONER

## 2025-01-07 PROCEDURE — 99213 OFFICE O/P EST LOW 20 MIN: CPT | Performed by: NURSE PRACTITIONER

## 2025-01-07 NOTE — PROGRESS NOTES
Subjective   Emelyn Lopez is a 49 y.o. female presenting for follow up for migraine headaches. She is taking topiramate 50 mg twice daily, Qulipta 60 mg daily along with Ubrelvy and Zavzpret as needed. While this has improved her migraines quite a bit, she continues to have at least 15 headache days per month.      She has photophobia, phonophobia, and nausea with her headaches. The location of the pain varies. She also has a history of chronic neck pain. She has been to PT without much benefit.     Previous tried medications: Celexa and amitriptyline    I performed this clinical encounter by utilizing a real-time telehealth video connection between my location and the patient's location at home.  I obtained verbal consent from the patient to perform this clinical encounter utilizing video and prepared the patient by answering any questions they had about the telehealth interaction.    History of Present Illness     Review of Systems    I have reviewed and confirmed the accuracy of the patient's history: Chief complaint, HPI, ROS, Subjective, and Past Family Social History as entered by the MA/LPN/RN. Lui Block, APRN 01/07/25      Objective     Physical Examination:  General Appearance:  Well developed, well nourished, well groomed, alert, and cooperative.  HEENT: Normocephalic.      Neurological examination:   Higher Integrative Function: Oriented to time, place, and person. Normal registration, recall attention span and concentration. Normal language including comprehension, spontaneous speech, repetition, reading, writing, naming and vocabulary.      Assessment & Plan   Diagnoses and all orders for this visit:    1. Intractable chronic migraine with aura and without status migrainosus (Primary)    Given she continues to have frequent headaches despite continuing topiramate and Qulipta, I recommended adding Botox injections to this regimen. She is interested in trying this. Risks and benefits were  reviewed. Side effects discussed. We will submit for insurance approval and if improved contact her to set up the appointment. She will continue Qulipta 60 mg daily and topiramate 50 mg twice a day for now. Continue Ubrelvy and Zavzpret as needed.      I spent 21 minutes caring for patient on todays date of service. This time includes time spent by me in the following activities: obtaining and/or reviewing a separately obtained history, performing a medically appropriate examination and/or evaluation, counseling and educating the patient on diagnosis and treatment, ordering medications, tests, or procedures, referring and communicating with other health care professionals and documenting information in the medical record.

## 2025-01-14 ENCOUNTER — PROCEDURE VISIT (OUTPATIENT)
Dept: NEUROLOGY | Facility: CLINIC | Age: 50
End: 2025-01-14
Payer: COMMERCIAL

## 2025-01-14 DIAGNOSIS — G43.E19 INTRACTABLE CHRONIC MIGRAINE WITH AURA AND WITHOUT STATUS MIGRAINOSUS: Primary | ICD-10-CM

## 2025-01-14 PROCEDURE — 64615 CHEMODENERV MUSC MIGRAINE: CPT | Performed by: NURSE PRACTITIONER

## 2025-01-14 NOTE — PROGRESS NOTES
Procedure   Procedures     The patient is here today for her first set of Botox injections for intractable and refractory migraines. She currently reports a migraine at least 4 days per week, with about one severe migraine per week.     Risks, benefits, and potential side effects of Botox were discussed with her and she consented to the procedure.    Procedure:    The patient's skin was prepped with alcohol prior to injections.    5 units were injected into each  muscle.   5 units were injected into the procerus muscle.  20 units total were injected into 4 sites of bilateral frontalis muscles.  40 units total were injected into 8 sites of bilateral temporalis muscles  30 units total were injected into 6 sites of bilateral occipitalis muscles.  20 units total were injected into 4 sites of bilateral cervical paraspinal muscles.  30 units total were injected into 6 sites of bilateral trapezius muscles.  A total of [155] units of Botox was given. 45 units were unused.    The patient tolerated the procedure well. There was minimal bleeding. There were no complications.   Prior to departure, a 3 month follow up was scheduled for repeat Botox injections.    Botox Buy & bill

## 2025-04-17 ENCOUNTER — PROCEDURE VISIT (OUTPATIENT)
Dept: NEUROLOGY | Facility: CLINIC | Age: 50
End: 2025-04-17
Payer: COMMERCIAL

## 2025-04-17 DIAGNOSIS — G43.E19 INTRACTABLE CHRONIC MIGRAINE WITH AURA AND WITHOUT STATUS MIGRAINOSUS: Primary | ICD-10-CM

## 2025-04-17 NOTE — PROGRESS NOTES
Procedure   Procedures   Botox    The patient is here today for Botox injections for intractable and refractory migraines. She reports significant improvement in her migraines with Botox injections. Her migraines reduced from almost daily down to only 7 migraines over the past 3 months.     Risks, benefits, and potential side effects of Botox were discussed with her and she consented to the procedure.    Procedure:    The patient's skin was prepped with alcohol prior to injections.    5 units were injected into each  muscle.   5 units were injected into the procerus muscle.  20 units total were injected into 4 sites of bilateral frontalis muscles.  40 units total were injected into 8 sites of bilateral temporalis muscles  30 units total were injected into 6 sites of bilateral occipitalis muscles.  20 units total were injected into 4 sites of bilateral cervical paraspinal muscles.  30 units total were injected into 6 sites of bilateral trapezius muscles.  A total of [155] units of Botox was given. 45 units were unused.      The patient tolerated the procedure well. There was minimal bleeding. There were no complications.   Prior to departure, a 3 month follow up was scheduled for repeat Botox injections.      Buy and Bill, Office Supplied

## 2025-06-03 RX ORDER — TOPIRAMATE 50 MG/1
50 TABLET, FILM COATED ORAL 2 TIMES DAILY
Qty: 60 TABLET | Refills: 5 | Status: SHIPPED | OUTPATIENT
Start: 2025-06-03

## 2025-07-29 ENCOUNTER — PROCEDURE VISIT (OUTPATIENT)
Dept: NEUROLOGY | Facility: CLINIC | Age: 50
End: 2025-07-29
Payer: COMMERCIAL

## 2025-07-29 DIAGNOSIS — G43.E19 INTRACTABLE CHRONIC MIGRAINE WITH AURA AND WITHOUT STATUS MIGRAINOSUS: Primary | ICD-10-CM

## 2025-07-29 PROCEDURE — 64615 CHEMODENERV MUSC MIGRAINE: CPT | Performed by: NURSE PRACTITIONER

## 2025-07-29 NOTE — PROGRESS NOTES
Procedure   Procedures   Botox    The patient is here today for Botox injections.  Diagnosis: Chronic migraine without aura, intractable, without status migrainosus   They report significant improvement in their migraines with Botox injections. The migraines have reduced by 50% in frequency and are more responsive to abortive medications.     Risks, benefits, and potential side effects of Botox were discussed with her and she consented to the procedure.    Procedure:    The patient's skin was prepped with alcohol prior to injections.    5 units were injected into each  muscle.   5 units were injected into the procerus muscle.  20 units total were injected into 4 sites of bilateral frontalis muscles.  40 units total were injected into 8 sites of bilateral temporalis muscles  30 units total were injected into 6 sites of bilateral occipitalis muscles.  20 units total were injected into 4 sites of bilateral cervical paraspinal muscles.  30 units total were injected into 6 sites of bilateral trapezius muscles.  A total of [155] units of Botox was given. 45 units were unused.      The patient tolerated the procedure well. There was minimal bleeding. There were no complications.   Prior to departure, a 3 month follow up was scheduled for repeat Botox injections.      Buy and bill, Office Supplied